# Patient Record
Sex: MALE | Race: WHITE | NOT HISPANIC OR LATINO | Employment: FULL TIME | URBAN - METROPOLITAN AREA
[De-identification: names, ages, dates, MRNs, and addresses within clinical notes are randomized per-mention and may not be internally consistent; named-entity substitution may affect disease eponyms.]

---

## 2017-10-31 ENCOUNTER — OFFICE VISIT (OUTPATIENT)
Dept: OTOLARYNGOLOGY | Facility: CLINIC | Age: 62
End: 2017-10-31

## 2017-10-31 VITALS — WEIGHT: 315 LBS | BODY MASS INDEX: 44.1 KG/M2 | HEIGHT: 71 IN

## 2017-10-31 DIAGNOSIS — C80.1 ADENOID CYSTIC CARCINOMA (H): Primary | ICD-10-CM

## 2017-10-31 ASSESSMENT — PAIN SCALES - GENERAL: PAINLEVEL: NO PAIN (0)

## 2017-10-31 NOTE — LETTER
10/31/2017       RE: Gabriele Márquez  [address] [facility] [address]     Dear Colleague,    Thank you for referring your patient, Gabriele Márquez, to the [facility] EAR NOSE [facility] THROAT at [facility]. Please see a copy of my visit note below.    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  He is 18 years out now.  He has a problem with his gold weight or similar with some recurrent conjunctivitis on the left side.  He is on eyedrops for that right now.  This seems to be an intermittent problem that recurs for him.  He has no other new complaints at the present time today.  He is otherwise getting by quite well.  His eating, drinking, breathing and swallowing are fine. He had a septic episode from a tick bit tjhat may have been related to lyme disease and was hospitalized fot that.      PHYSICAL EXAMINATION:  The patient is alert, oriented x3 and pleasant.  Skin of the face, lips, and neck on him is quite normal.  His oral cavity and oropharynx is clear throughout.  The neck and face on the left side show the previous changes from the surgery and radiation.  They look about the same.  His ear canals and tympanic membranes are examined.  He has a lot of deep wax on the left side.  I had to go ahead and extract the wax with alligator forceps on the left side.  Under magnification, I was able to get all the wax out deep near his drum.  He tolerated this quite well.  In looking at his eye on the left side, there is no question that I could palpate the gold weight.  There is a little bit of irritation to the conjunctiva as well.      ASSESSMENT AND PLAN:  Patient with a history of adenoid cystic cancer.  He will get his follow-up chest x-ray today.   We will see how he is doing again over the course of the next one year or so.      FO/ms     Sincerely,    Jose F Rock MD

## 2017-10-31 NOTE — PROGRESS NOTES
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  He is 18 years out now.  He has a problem with his gold weight or similar with some recurrent conjunctivitis on the left side.  He is on eyedrops for that right now.  This seems to be an intermittent problem that recurs for him.  He has no other new complaints at the present time today.  He is otherwise getting by quite well.  His eating, drinking, breathing and swallowing are fine. He had a septic episode from a tick bit tjhat may have been related to lyme disease and was hospitalized fot that.      PHYSICAL EXAMINATION:  The patient is alert, oriented x3 and pleasant.  Skin of the face, lips, and neck on him is quite normal.  His oral cavity and oropharynx is clear throughout.  The neck and face on the left side show the previous changes from the surgery and radiation.  They look about the same.  His ear canals and tympanic membranes are examined.  He has a lot of deep wax on the left side.  I had to go ahead and extract the wax with alligator forceps on the left side.  Under magnification, I was able to get all the wax out deep near his drum.  He tolerated this quite well.  In looking at his eye on the left side, there is no question that I could palpate the gold weight.  There is a little bit of irritation to the conjunctiva as well.      ASSESSMENT AND PLAN:  Patient with a history of adenoid cystic cancer.  He will get his follow-up chest x-ray today.   We will see how he is doing again over the course of the next one year or so.      FO/ms

## 2017-10-31 NOTE — MR AVS SNAPSHOT
After Visit Summary   10/31/2017    Gabriele Márquez    MRN: [id number]           Patient Information     Date Of Birth          1955        Visit Information        Provider Department      10/31/2017 10:00 AM Jose F Rock MD [facility] Ear Nose and Throat        Today's Diagnoses     Adenoid cystic carcinoma (H)    -  1       Follow-ups after your visit        Follow-up notes from your care team     Return in about 1 year (around 10/31/2018).      Future tests that were ordered for you today     Open Future Orders        Priority Expected Expires Ordered    X-ray Chest 2 Views Routine 10/1/2018 10/31/2018 10/31/2017            Who to contact     Please call your clinic at [phone] to:    Ask questions about your health    Make or cancel appointments    Discuss your medicines    Learn about your test results    Speak to your doctor   If you have compliments or concerns about an experience at your clinic, or if you wish to file a complaint, please contact [facility] Patient Relations at [phone] or email us at [email or web address]         Additional Information About Your Visit        MyChart Information     [employer] is an electronic gateway that provides easy, online access to your medical records. With [employer], you can request a clinic appointment, read your test results, renew a prescription or communicate with your care team.     To sign up for [employer] visit the website at [email or web address]   You will be asked to enter the access code listed below, as well as some personal information. Please follow the directions to create your username and password.     Your access code is: [email or web address]  Expires: 1/15/2018  6:31 AM     Your access code will  in 90 days. If you need help or a new code, please contact your [facility] Clinic or call [phone] for assistance.        Care EveryWhere ID     This is your  "Care EveryWhere ID. This could be used by other organizations to access your [place] medical records  [id number]        Your Vitals Were     Height BMI (Body Mass Index)                1.8 m (5' 10.87\") 46.62 kg/m2           Blood Pressure from Last 3 Encounters:   09/08/16 (!) 134/94    Weight from Last 3 Encounters:   10/31/17 (!) 151 kg (333 lb)   09/08/16 (!) 156.1 kg (344 lb 3 oz)   07/12/16 (!) 161.8 kg (356 lb 9.6 oz)               Primary Care Provider Office Phone # Fax #    Hilario Vasquez [phone]       [place] [address]        Equal Access to Services     MICHELLE ROWLEY : Karey fergusono Soomaali, waaxda luqadaha, qaybta kaalmada adeegyada, gayatri stout. So [facility] [phone].    ATENCIÓN: Si habla español, tiene a giron disposición servicios gratuitos de asistencia lingüística. [facility] [phone].    We comply with applicable federal civil rights laws and [place] laws. We do not discriminate on the basis of race, color, national origin, age, disability, sex, sexual orientation, or gender identity.            Thank you!     Thank you for choosing [facility] EAR NOSE AND THROAT  for your care. Our goal is always to provide you with excellent care. Hearing back from our patients is one way we can continue to improve our services. Please take a few minutes to complete the written survey that you may receive in the mail after your visit with us. Thank you!             Your Updated Medication List - Protect others around you: Learn how to safely use, store and throw away your medicines at www.disposemymeds.org.          This list is accurate as of: 10/31/17 11:02 AM.  Always use your most recent med list.                   Brand Name Dispense Instructions for use Diagnosis    SYNTHROID 175 MCG tablet   Generic drug:  levothyroxine      Take 1 tablet by mouth daily.        VITAMIN D PO      Take  by mouth.          "

## 2019-01-03 ENCOUNTER — PATIENT OUTREACH (OUTPATIENT)
Dept: CARE COORDINATION | Facility: CLINIC | Age: 64
End: 2019-01-03

## 2019-01-15 ENCOUNTER — ANCILLARY PROCEDURE (OUTPATIENT)
Dept: GENERAL RADIOLOGY | Facility: CLINIC | Age: 64
End: 2019-01-15
Payer: COMMERCIAL

## 2019-01-15 ENCOUNTER — TELEPHONE (OUTPATIENT)
Dept: OTOLARYNGOLOGY | Facility: CLINIC | Age: 64
End: 2019-01-15

## 2019-01-15 ENCOUNTER — OFFICE VISIT (OUTPATIENT)
Dept: OTOLARYNGOLOGY | Facility: CLINIC | Age: 64
End: 2019-01-15
Payer: COMMERCIAL

## 2019-01-15 VITALS — BODY MASS INDEX: 42.66 KG/M2 | HEIGHT: 72 IN | WEIGHT: 315 LBS

## 2019-01-15 DIAGNOSIS — H60.312 ACUTE DIFFUSE OTITIS EXTERNA OF LEFT EAR: Primary | ICD-10-CM

## 2019-01-15 DIAGNOSIS — H60.312 ACUTE DIFFUSE OTITIS EXTERNA OF LEFT EAR: ICD-10-CM

## 2019-01-15 DIAGNOSIS — H60.22 ACUTE MALIGNANT OTITIS EXTERNA OF LEFT EAR: ICD-10-CM

## 2019-01-15 DIAGNOSIS — C80.1 ADENOID CYSTIC CARCINOMA (H): ICD-10-CM

## 2019-01-15 DIAGNOSIS — C80.1 ADENOID CYSTIC CARCINOMA (H): Primary | ICD-10-CM

## 2019-01-15 RX ORDER — NEOMYCIN/POLYMYXIN B/HYDROCORT 3.5-10K-1
1-2 SUSPENSION, DROPS(FINAL DOSAGE FORM)(ML) OPHTHALMIC (EYE) 4 TIMES DAILY
Qty: 4 ML | Refills: 0 | Status: SHIPPED | OUTPATIENT
Start: 2019-01-15 | End: 2019-01-18

## 2019-01-15 ASSESSMENT — MIFFLIN-ST. JEOR: SCORE: 2362.55

## 2019-01-15 ASSESSMENT — PAIN SCALES - GENERAL: PAINLEVEL: NO PAIN (0)

## 2019-01-15 NOTE — TELEPHONE ENCOUNTER
[facility] Call Center    Phone Message    May a detailed message be left on voicemail: yes    Reason for Call: Other: [facility] pharmacy [phone], wants to know is there another prescription that is more cost efficient for the pt., states that the medication is covered but a higher co pay, please reach out to pharmacy to possible change the prescription.     Action Taken: Other: ENT

## 2019-01-15 NOTE — LETTER
1/15/2019       RE: Gabriele Márquez  [address] [facility] [address]     Dear Colleague,    Thank you for referring your patient, Gabriele Márquez, to the [facility] EAR NOSE AND THROAT at [facility]. Please see a copy of my visit note below.    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  He is 18 years out now.  He has a problem with his gold weight or similar with some recurrent conjunctivitis on the left side.  He is on eyedrops for that right now.  This seems to be an intermittent problem that recurs for him.  He has no other new complaints at the present time today.  He is otherwise getting by quite well.  His eating, drinking, breathing and swallowing are fine. He had a septic episode from a tick bit tjhat may have been related to lyme disease and was hospitalized fot that.      PHYSICAL EXAMINATION:  The patient is alert, oriented x3 and pleasant.  Skin of the face, lips, and neck on him is quite normal.  His oral cavity and oropharynx is clear throughout.  The neck and face on the left side show the previous changes from the surgery and radiation.  They look about the same.  His ear canals and tympanic membranes are examined.  He has a lot of deep wax on the left side.  I had to go ahead and extract the wax with alligator forceps on the left side.  Under magnification, I was able to get all the wax out deep near his drum.  He tolerated this quite well.  In looking at his eye on the left side, there is no question that I could palpate the gold weight.  There is a little bit of irritation to the conjunctiva as well.      ASSESSMENT AND PLAN:  Patient with a history of adenoid cystic cancer.  He will get his follow-up chest x-ray today.   We will see how he is doing again over the course of the next one year or so.      FO/ms       Sincerely,    Jose F Rock MD

## 2019-01-15 NOTE — NURSING NOTE
Chief Complaint   Patient presents with     RECHECK     annual follow up adenoid cystic carcinoma - swelling on left side      Neel Delgadillo, EMT

## 2019-01-16 LAB — RADIOLOGIST FLAGS: NORMAL

## 2019-01-16 NOTE — TELEPHONE ENCOUNTER
Talked w/ the pharmacistErmias. Recommends neomycin-polymyxin-DEXAMETHASONE instead of neomycin-polymyxin-hydrocortisone. Notified Dr. Rock's nurse, Lalo Andrews RN. Lalo will consult w/ Dr. Rock and call pharmacy back.    Sonja Guzman LPN

## 2019-01-18 ENCOUNTER — TELEPHONE (OUTPATIENT)
Dept: OTOLARYNGOLOGY | Facility: CLINIC | Age: 64
End: 2019-01-18

## 2019-01-18 DIAGNOSIS — H60.312 ACUTE DIFFUSE OTITIS EXTERNA OF LEFT EAR: ICD-10-CM

## 2019-01-18 DIAGNOSIS — C80.1 ADENOID CYSTIC CARCINOMA (H): Primary | ICD-10-CM

## 2019-01-18 RX ORDER — NEOMYCIN POLYMYXIN B SULFATES AND DEXAMETHASONE 3.5; 10000; 1 MG/ML; [USP'U]/ML; MG/ML
SUSPENSION/ DROPS OPHTHALMIC
Qty: 5 ML | Refills: 0 | Status: SHIPPED | OUTPATIENT
Start: 2019-01-18 | End: 2019-01-22

## 2019-01-18 NOTE — TELEPHONE ENCOUNTER
"I received a call from [facility], informing me that Dr. Leno Bains placed a radiologist flag on patient's xray chest 01/15/2019.    Per report \"new retrocardiac consolidation which on the lateral chest x-ray near the thoracic vertebral body or lung base.. Consider further work up with CT scan.\"    Dr. Leno Bains could be reached at [phone].    I called and left a message for Dr. Rock to return call.  I also e-mailed him.    I briefly discussed above with JENIFER La.  She instructed me to contact Dr. Rock and patient.    I was unable to get a hold of patient, I left a voice message encouraging a call back as soon as possible.  I will discuss about symptoms.    Will wait to hear back from Dr. Rock and patient.   "

## 2019-01-18 NOTE — TELEPHONE ENCOUNTER
I discussed below with Dr. Rock.  He recommended CT lung without contract in 6-8 weeks.      I called and informed patient.     He reports feeling well.   He denied any unusual fever, chills, coughing, chest discomfort, back discomfort, fatigue, loss of appetite, clammy skin, sweating, fast/shallow breathing, coughing, wheezing, nasal congestion at this time.  He does note some shortness of breathe per his norm.    I encouraged him to follow up with primary care provider if he was to have any symptoms in the future.    He will schedule CT without contract lung scan in 6-8 weeks.

## 2019-01-18 NOTE — TELEPHONE ENCOUNTER
I discussed below with Dr. Rock and he approved pharmacy alternative recommendation, neomycin-polymixin-dexamethasone (MAXITROL) 0.1 % ophthalmic suspension, apply 1-2 drops to eye 4 times daily for 10 days.    I called and informed Luc at the pharmacy, updated rx, and will inform patient.

## 2019-01-18 NOTE — TELEPHONE ENCOUNTER
[facility] Call Center    Phone Message    May a detailed message be left on voicemail: yes    Reason for Call: Other: [facility] [place]  pharmacy [phone], states that the script is for pts eyes, and pt is stating he he needs a script for wax in his ears. Need more clarification on whats going on with this medication, or need a new script for pt ear for the wax     Action Taken: Message routed to:  [facility] ENT

## 2019-01-21 ENCOUNTER — TELEPHONE (OUTPATIENT)
Dept: OTOLARYNGOLOGY | Facility: CLINIC | Age: 64
End: 2019-01-21

## 2019-01-21 NOTE — TELEPHONE ENCOUNTER
[facility] Call [facility]    Phone Message    May a detailed message be left on voicemail: yes    Reason for Call: Other: pt. pharmacy called to see what is going on with RX neomycin-polymixin-dexamethasone (MAXITROL) 0.1 % ophthalmic suspension. The directions state to put in the eyes but pt. says he isbeing treated for his ears. please call pharmacy to clarify directions asap as they have been waiting for some time.      Action Taken: Message routed to:  [facility] ENT

## 2019-01-21 NOTE — TELEPHONE ENCOUNTER
[facility] Call [facility]    Phone Message    May a detailed message be left on voicemail: yes    Reason for Call: Other: Other: [facility] Cell [phone], Pt.'s wife called because the Pt. and she is confused on why the pt. needs a CT scan. They would like if someone can call discuss      Action Taken: Message routed to:  [facility] ENT

## 2019-01-21 NOTE — TELEPHONE ENCOUNTER
[facility] Call [facility]    Phone Message    May a detailed message be left on voicemail: yes    Reason for Call: Other: Other: [facility] Cell [phone], Pt.'s wife called because the Pt. and she is confused on why the pt. needs a CT scan. They would like if someone can call discuss      Action Taken: Message routed to:  [facility] ENT

## 2019-01-21 NOTE — TELEPHONE ENCOUNTER
[facility] Call [facility]    Phone Message    May a detailed message be left on voicemail: yes    Reason for Call: Other: [facility] [phone], Pt.'s wife called because the Pt. and she is confused on why the pt. needs a CT scan. They would like if someone can call discuss     Action Taken: Message routed to:  [facility] ENT

## 2019-01-22 RX ORDER — NEOMYCIN POLYMYXIN B SULFATES AND DEXAMETHASONE 3.5; 10000; 1 MG/ML; [USP'U]/ML; MG/ML
SUSPENSION/ DROPS OPHTHALMIC
Qty: 5 ML | Refills: 0 | Status: ON HOLD | OUTPATIENT
Start: 2019-01-22 | End: 2021-06-29

## 2020-06-26 ENCOUNTER — TELEPHONE (OUTPATIENT)
Dept: OTOLARYNGOLOGY | Facility: CLINIC | Age: 65
End: 2020-06-26

## 2020-06-26 NOTE — TELEPHONE ENCOUNTER
Phone call returned. No answer. Voicemail left with direct line for call-back.     Natice Schwab, RN BSN

## 2020-06-26 NOTE — TELEPHONE ENCOUNTER
[facility] Call [facility]    Phone Message    May a detailed message be left on voicemail: yes     Reason for Call: Symptoms or Concerns     If patient has red-flag symptoms, warm transfer to triage line    Current symptom or concern: mouth dryness     Symptoms have been present for:  Multiple week(s)    Has patient previously been seen for this? Yes    By : Ondrey     Date: 1/2019    Are there any new or worsening symptoms? Yes: pt wife calling in as patient is normally seen yearly for follow up -  She stating he is having more mouth dryness and speech issues - would like to know if he can be seen in clinic soon     Please advise       Action Taken: Message routed to:  [facility] ENT    Travel Screening: Not Applicable

## 2020-07-14 ENCOUNTER — ANCILLARY PROCEDURE (OUTPATIENT)
Dept: GENERAL RADIOLOGY | Facility: CLINIC | Age: 65
End: 2020-07-14
Attending: OTOLARYNGOLOGY
Payer: COMMERCIAL

## 2020-07-14 ENCOUNTER — OFFICE VISIT (OUTPATIENT)
Dept: OTOLARYNGOLOGY | Facility: CLINIC | Age: 65
End: 2020-07-14
Payer: COMMERCIAL

## 2020-07-14 VITALS
HEART RATE: 82 BPM | BODY MASS INDEX: 44.89 KG/M2 | WEIGHT: 315 LBS | OXYGEN SATURATION: 83 % | TEMPERATURE: 97.9 F | SYSTOLIC BLOOD PRESSURE: 126 MMHG | DIASTOLIC BLOOD PRESSURE: 83 MMHG

## 2020-07-14 DIAGNOSIS — C80.1 ADENOID CYSTIC CARCINOMA (H): Primary | ICD-10-CM

## 2020-07-14 DIAGNOSIS — C80.1 ADENOID CYSTIC CARCINOMA (H): ICD-10-CM

## 2020-07-14 RX ORDER — LEVOTHYROXINE SODIUM 150 UG/1
175 TABLET ORAL
COMMUNITY
Start: 2019-10-02

## 2020-07-14 ASSESSMENT — PAIN SCALES - GENERAL: PAINLEVEL: NO PAIN (0)

## 2020-07-14 NOTE — NURSING NOTE
Chief Complaint   Patient presents with     RECHECK     follow up      Blood pressure 126/83, pulse 82, temperature 97.9  F (36.6  C), weight (!) 150.1 kg (331 lb), SpO2 (!) 83 %.    Alfredo Ortega LPN

## 2020-07-14 NOTE — PROGRESS NOTES
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  He is 18 years out now.  He has a problem with his gold weight or similar with some recurrent conjunctivitis on the left side.  He is on eyedrops for that right now.  This seems to be an intermittent problem that recurs for him.  He has no other new complaints at the present time today.  He is otherwise getting by quite well.  His eating, drinking, breathing and swallowing are fine. He had a septic episode from a tick bit tjhat may have been related to lyme disease and was hospitalized fot that.      PHYSICAL EXAMINATION:  The patient is alert, oriented x3 and pleasant.  Skin of the face, lips, and neck on him is quite normal.  His oral cavity and oropharynx is clear throughout.  The neck and face on the left side show the previous changes from the surgery and radiation.  They look about the same.  His ear canals and tympanic membranes are examined.  He has a lot of deep wax on the left side.  I had to go ahead and extract the wax with alligator forceps on the left side.  Under magnification, I was able to get all the wax out deep near his drum.  He tolerated this quite well.  In looking at his eye on the left side, there is no question that I could palpate the gold weight.  There is a little bit of irritation to the conjunctiva as well.      ASSESSMENT AND PLAN:  Patient with a history of adenoid cystic cancer.  He will get his follow-up chest x-ray today.   We will see how he is doing again over the course of the next one year or so.   However he has expresed wanting to see dr Flores valentino about nasal valve issues.    FO/ms

## 2020-07-14 NOTE — LETTER
7/14/2020       RE: Gabriele Márquez  [address] [facility] [address]     Dear Colleague,    Thank you for referring your patient, Gabriele Márquez, to the [facility] EAR NOSE AND THROAT at [facility]. Please see a copy of my visit note below.    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  He is 18 years out now.  He has a problem with his gold weight or similar with some recurrent conjunctivitis on the left side.  He is on eyedrops for that right now.  This seems to be an intermittent problem that recurs for him.  He has no other new complaints at the present time today.  He is otherwise getting by quite well.  His eating, drinking, breathing and swallowing are fine. He had a septic episode from a tick bit tjhat may have been related to lyme disease and was hospitalized fot that.      PHYSICAL EXAMINATION:  The patient is alert, oriented x3 and pleasant.  Skin of the face, lips, and neck on him is quite normal.  His oral cavity and oropharynx is clear throughout.  The neck and face on the left side show the previous changes from the surgery and radiation.  They look about the same.  His ear canals and tympanic membranes are examined.  He has a lot of deep wax on the left side.  I had to go ahead and extract the wax with alligator forceps on the left side.  Under magnification, I was able to get all the wax out deep near his drum.  He tolerated this quite well.  In looking at his eye on the left side, there is no question that I could palpate the gold weight.  There is a little bit of irritation to the conjunctiva as well.      ASSESSMENT AND PLAN:  Patient with a history of adenoid cystic cancer.  He will get his follow-up chest x-ray today.   We will see how he is doing again over the course of the next one year or so.   However he has expresed wanting to see dr Flores valentino about nasal valve issues.    FO/ms          Again, thank you for allowing me to participate in the care of your patient.      Sincerely,    Jose F Rock MD

## 2020-07-14 NOTE — PATIENT INSTRUCTIONS
1. You were seen in the ENT Clinic today by Dr. Rock.  If you have any questions or concerns after your appointment, please call   -  ENT Clinic: [phone]      2.   Plan to return to clinic 1 month for follow up and Chest Xray    Bren Felipe LPN  [facility]  [phone]

## 2020-07-15 ENCOUNTER — TELEPHONE (OUTPATIENT)
Dept: OTOLARYNGOLOGY | Facility: CLINIC | Age: 65
End: 2020-07-15

## 2020-07-15 NOTE — TELEPHONE ENCOUNTER
Writer called in regards to recent CXR that was completed yesterday. Writer reviewed results with patient and his wife. Results as follows:    IMPRESSION: Negative. No radiographic evidence of metastatic disease.       They acknowledged. Per Dr. Rock to schedule a yearly follow up. Writer offered to schedule. Patients wife states they would rather wait and call at a later time to get this scheduled.     Bren Felipe LPN

## 2020-07-15 NOTE — TELEPHONE ENCOUNTER
FUTURE VISIT INFORMATION      FUTURE VISIT INFORMATION:    Date: 8/19/2020    Time: 1:20PM    Location: [facility]  REFERRAL INFORMATION:    Referring provider:      Referring providers clinic:      Reason for visit/diagnosis  nasal valve issues.     RECORDS REQUESTED FROM:       Clinic name Comments Records Status Imaging Status   [facility] 7/14/2020 note from Dr Rock  [facility]   03/09/2020 note from Napoleon Araujo MD   Care Everywhere

## 2020-08-04 ENCOUNTER — TRANSCRIBE ORDERS (OUTPATIENT)
Dept: OTHER | Age: 65
End: 2020-08-04

## 2020-08-04 DIAGNOSIS — G47.33 OSA (OBSTRUCTIVE SLEEP APNEA): ICD-10-CM

## 2020-08-04 DIAGNOSIS — R06.09 DOE (DYSPNEA ON EXERTION): Primary | ICD-10-CM

## 2020-08-19 ENCOUNTER — OFFICE VISIT (OUTPATIENT)
Dept: OTOLARYNGOLOGY | Facility: CLINIC | Age: 65
End: 2020-08-19
Payer: COMMERCIAL

## 2020-08-19 ENCOUNTER — PRE VISIT (OUTPATIENT)
Dept: OTOLARYNGOLOGY | Facility: CLINIC | Age: 65
End: 2020-08-19

## 2020-08-19 VITALS — OXYGEN SATURATION: 89 % | HEART RATE: 91 BPM | WEIGHT: 315 LBS | BODY MASS INDEX: 45.3 KG/M2 | TEMPERATURE: 97.6 F

## 2020-08-19 DIAGNOSIS — G51.0 FACIAL PARALYSIS: Primary | ICD-10-CM

## 2020-08-19 DIAGNOSIS — C07 ADENOID CYSTIC CARCINOMA OF PAROTID GLAND (H): ICD-10-CM

## 2020-08-19 ASSESSMENT — PAIN SCALES - GENERAL: PAINLEVEL: NO PAIN (0)

## 2020-08-19 NOTE — PROGRESS NOTES
Facial Plastic and Reconstructive Surgery Consultation    Referring Provider:  Jose F Rock MD      HPI:   I had the pleasure of seeing Gabriele Márquez today in clinic for consultation for nasal valve collapse in the setting of facial paralysis.     Gabriele Márquez is a 64 year old male with a history of adenoid cystic carcinoma. This was first treated 18 years ago.  He underwent an initial surgery that was incomplete and subsequently had a radical parotidectomy with Dr. Hilario Magdaleno.  His wife and the patient describes that his left facial paralysis resulted from that initial surgery.  He had a fascia ryan sling to the left face from the left side for facial suspension and he also had a left upper eyelid gold weight.  In addition the patient underwent neutron beam therapy in [place].    Since that time he has had intermittent trouble with closure of his left eye and concerns with the eyelid weight.  He most recently underwent a procedure with Dr. Adithya Lynch for the left upper eyelid weight with a removal and replacement in 2016.  Patient still notes she has incomplete closure of the eye on that side.    He presented Dr. Rock who is following him here.  He has not had any recent MRIs but did have a PET/CT that is visible from 2019 in addition to repeat chest x-ray is evaluating for any possible disease.  His wife state has been several years since he has had repeat imaging to monitor the disease.    Today in discussion is notable that he has bilateral nasal valve collapse.  He cannot breathe from either side of his nose.  He states that he used to use Breathe Right strips but that now he uses a cone inside his nose at night to be able to breathe.  He does have noticeable bilateral facial weakness on taking him the history.  He has complete paralysis on the left but he has significant weakness on the right.     He does not note any cause or reason for his right facial weakness.  It is something that seems as if I  am pointing out to him for the first time.  He does state on history of that he was able to smile effectively on that side and his wife also states that he had full function and smiling there in the past.    He denies any difficulty eating and states he is adapted quite well.  He uses cups and does not have a need for using a straw.    His nasal obstruction significantly symptomatic.  He feels he cannot move any air and feels quite constricted.  He has difficulty at night.  His wife states that he has had a recent episode of ehrlichiosis which led to multiorgan failure.  She states he was intubated for quite some time.  Now residual sequelae of that seem to be a reliance on oxygen at night.  He has obstructive sleep apnea and uses a CPAP at night with 4 L of oxygen.  He does not use any oxygen during the day.    His wife states that he is a  and thus oxygen would not allow him to work and that he does relatively well without needing it during the day.    Left eye lagophthlamos, cant close with the weight  Bilateral paralysis    Sling on the left side,     Review Of Systems  ROS: 10 point ROS neg other than the symptoms noted above in the HPI.    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       neomycin-polymixin-dexamethasone (MAXITROL) 0.1 % ophthalmic suspension Apply 1-2 drops to ears 4 times daily for 10 days (Patient not taking: Reported on 7/14/2020) 5 mL 0     Nkda [no known drug  allergies]  Social History     Socioeconomic History     Marital status:      Spouse name: Not on file     Number of children: Not on file     Years of education: Not on file     Highest education level: Not on file   Occupational History     Not on file   Social Needs     Financial resource strain: Not on file     Food insecurity     Worry: Not on file     Inability: Not on file     Transportation needs     Medical: Not on file     Non-medical: Not on file   Tobacco Use     Smoking status: Never Smoker     Smokeless tobacco: Never Used   Substance and Sexual Activity     Alcohol use: Yes     Comment: one per week     Drug use: No     Sexual activity: Yes     Partners: Female     Birth control/protection: Female Surgical, None   Lifestyle     Physical activity     Days per week: Not on file     Minutes per session: Not on file     Stress: Not on file   Relationships     Social connections     Talks on phone: Not on file     Gets together: Not on file     Attends Confucianist service: Not on file     Active member of club or organization: Not on file     Attends meetings of clubs or organizations: Not on file     Relationship status: Not on file     Intimate partner violence     Fear of current or ex partner: Not on file     Emotionally abused: Not on file     Physically abused: Not on file     Forced sexual activity: Not on file   Other Topics Concern     Not on file   Social History Narrative     Not on file     Family History   Problem Relation Age of Onset     Glaucoma No family hx of      Macular Degeneration No family hx of        PE:  Alert and Oriented, Answering Questions Appropriately  Atraumatic, Normocephalic,   Significant bilateral facial weakness.  He has a masked face ease on examination.  On the left side he has no facial function.  He not cannot elevate his brow, he has paralytic lagophthalmos with an eyelid weight in.  It takes quite some time for his lid to relax down for closure.  He does  have some scleral show when that occurs.  He has a significant postradiation changes to the skin on the left and a large concavity at the radical parotidectomy site.  There is no exposed bone and his external auditory canal looks intact.  He has complete external nasal valve collapse on this left side.  Pulling on his cheek leads to significant resolution of his nasal obstruction.  He has lost significant volume in his face on the left.  Oral commissure continues to be suspended with a static sling.  On the right side he has significant midface volume loss.  He has also complete external nasal valve collapse.  When asked to perform voluntary function on the right side of the face he has evidence of some voluntary twitching however this is approximately only 80% or 85% of the expected normal.  He has limited brow elevation.  He has eye closure but this takes effort.  His blink is decreased.  His nasalis and levator labile are quite limited in function the midface weakness is substantial.  His oral commissure has limited excursion and only excursion has excursion of about 20% of expected.  He can pucker with the upper and lower lip but has some difficulty lowering his lower lip.  Most significant is the total collapse of his nasal valve on the right and a significant volume loss in his midface.  No masses are palpable in his neck face or anywhere on the exam.  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  He does however have a facial weakness on the right side.  This affects all of the branches of the face.  He does feel that the brow elevation was limited from when he was a child due to a laceration that occurred across his brow however this seems primary motor to me.  I noted weakness in all of the branches of the facial nerve.  Most substantial with his midface weakness.  He also has significant volume loss in the midface.  He has difficulty moving his upper lip most consistently and his speech is affected by this.    Looking at his history I have a hard time explaining the right facial weakness just based on the past treatments.  I do think it would be quite valuable to perform an MRI to evaluate the 7th cranial nerve and for general kind of evaluation with his history of adenoid cystic carcinoma.  We will be able to treat his nasal valves bilaterally.  I would do this using fascia from the right thigh creating 2 strips 1 for the left and 1 for the right and suspending the nasal ala bilaterally.  This is been shown in patients like him to have significant improvement in breathing results.  He would have a more challenging exposure with the scarring on the left and I discussed this with them.  I still think however that that would be appropriate.    He does have obstructive sleep apnea and thus any surgery would have to be done in the hospital and he have to be observed afterwards.  He also has claustrophobia and thus the MRI would have to be performed in an open scanner.  We will work to schedule this for him.  I will call him with the results.          Photodocumentation was obtained.     I spent a total of 44 minutes face-to-face with  Gabriele Márquez during today's office visit.  Over 50% of this time was spent counseling the patient and/or coordinating care regarding facial paralysis.  See note for details.

## 2020-08-19 NOTE — NURSING NOTE
Chief Complaint   Patient presents with     Consult     Nasal obstruction         Pulse 91, temperature 97.6  F (36.4  C), weight (!) 151.5 kg (334 lb), SpO2 (!) 89 %.    Tashia Gooden, EMT

## 2020-08-19 NOTE — LETTER
8/19/2020       RE: Gabriele Márquez  [address] [address]     Dear Colleague,    Thank you for referring your patient, Gabriele Márquez, to the [facility] EAR NOSE AND THROAT at [facility]. Please see a copy of my visit note below.    Facial Plastic and Reconstructive Surgery Consultation    Referring Provider:  Jose F Rock MD      HPI:   I had the pleasure of seeing Gabriele Márquez today in clinic for consultation for nasal valve collapse in the setting of facial paralysis.     Gabriele Márquez is a 64 year old male with a history of adenoid cystic carcinoma. This was first treated 18 years ago.  He underwent an initial surgery that was incomplete and subsequently had a radical parotidectomy with Dr. Hilario Magdaleno.  His wife and the patient describes that his left facial paralysis resulted from that initial surgery.  He had a fascia ryan sling to the left face from the left side for facial suspension and he also had a left upper eyelid gold weight.  In addition the patient underwent neutron beam therapy in [place].    Since that time he has had intermittent trouble with closure of his left eye and concerns with the eyelid weight.  He most recently underwent a procedure with Dr. Adithya Lynch for the left upper eyelid weight with a removal and replacement in 2016.  Patient still notes she has incomplete closure of the eye on that side.    He presented Dr. Rock who is following him here.  He has not had any recent MRIs but did have a PET/CT that is visible from 2019 in addition to repeat chest x-ray is evaluating for any possible disease.  His wife state has been several years since he has had repeat imaging to monitor the disease.    Today in discussion is notable that he has bilateral nasal valve collapse.  He cannot breathe from either side of his nose.  He states that he used to use Breathe Right strips but that now he uses a cone inside his nose at night to be  able to breathe.  He does have noticeable bilateral facial weakness on taking him the history.  He has complete paralysis on the left but he has significant weakness on the right.     He does not note any cause or reason for his right facial weakness.  It is something that seems as if I am pointing out to him for the first time.  He does state on history of that he was able to smile effectively on that side and his wife also states that he had full function and smiling there in the past.    He denies any difficulty eating and states he is adapted quite well.  He uses cups and does not have a need for using a straw.    His nasal obstruction significantly symptomatic.  He feels he cannot move any air and feels quite constricted.  He has difficulty at night.  His wife states that he has had a recent episode of ehrlichiosis which led to multiorgan failure.  She states he was intubated for quite some time.  Now residual sequelae of that seem to be a reliance on oxygen at night.  He has obstructive sleep apnea and uses a CPAP at night with 4 L of oxygen.  He does not use any oxygen during the day.    His wife states that he is a  and thus oxygen would not allow him to work and that he does relatively well without needing it during the day.    Left eye lagophthlamos, cant close with the weight  Bilateral paralysis    Sling on the left side,     Review Of Systems  ROS: 10 point ROS neg other than the symptoms noted above in the HPI.    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       neomycin-polymixin-dexamethasone (MAXITROL) 0.1 % ophthalmic suspension Apply 1-2 drops to ears 4 times daily for 10 days (Patient not taking: Reported on 7/14/2020) 5 mL 0     Nkda [no known drug allergies]  Social History     Socioeconomic History     Marital status:      Spouse name: Not on file     Number of children: Not on file     Years of education: Not on file     Highest education level: Not on file   Occupational History     Not on file   Social Needs     Financial resource strain: Not on file     Food insecurity     Worry: Not on file     Inability: Not on file     Transportation needs     Medical: Not on file     Non-medical: Not on file   Tobacco Use     Smoking status: Never Smoker     Smokeless tobacco: Never Used   Substance and Sexual Activity     Alcohol use: Yes     Comment: one per week     Drug use: No     Sexual activity: Yes     Partners: Female     Birth control/protection: Female Surgical, None   Lifestyle     Physical activity     Days per week: Not on file     Minutes per session: Not on file     Stress: Not on file   Relationships     Social connections     Talks on phone: Not on file     Gets together: Not on file     Attends Amish service: Not on file     Active member of club or organization: Not on file     Attends meetings of clubs or organizations: Not on file     Relationship status: Not on file     Intimate partner violence     Fear of current or ex partner: Not on file     Emotionally abused: Not on file     Physically abused: Not on file     Forced sexual activity: Not on file   Other Topics Concern     Not on file   Social History Narrative     Not on file     Family History   Problem Relation Age of Onset     Glaucoma No family hx of      Macular Degeneration No family hx of        PE:  Alert and Oriented, Answering Questions Appropriately  Atraumatic,  Normocephalic,   Significant bilateral facial weakness.  He has a masked face ease on examination.  On the left side he has no facial function.  He not cannot elevate his brow, he has paralytic lagophthalmos with an eyelid weight in.  It takes quite some time for his lid to relax down for closure.  He does have some scleral show when that occurs.  He has a significant postradiation changes to the skin on the left and a large concavity at the radical parotidectomy site.  There is no exposed bone and his external auditory canal looks intact.  He has complete external nasal valve collapse on this left side.  Pulling on his cheek leads to significant resolution of his nasal obstruction.  He has lost significant volume in his face on the left.  Oral commissure continues to be suspended with a static sling.  On the right side he has significant midface volume loss.  He has also complete external nasal valve collapse.  When asked to perform voluntary function on the right side of the face he has evidence of some voluntary twitching however this is approximately only 80% or 85% of the expected normal.  He has limited brow elevation.  He has eye closure but this takes effort.  His blink is decreased.  His nasalis and levator labile are quite limited in function the midface weakness is substantial.  His oral commissure has limited excursion and only excursion has excursion of about 20% of expected.  He can pucker with the upper and lower lip but has some difficulty lowering his lower lip.  Most significant is the total collapse of his nasal valve on the right and a significant volume loss in his midface.  No masses are palpable in his neck face or anywhere on the exam.  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  He does however have a facial weakness on the right side.  This affects all of the branches of the face.  He does feel that the brow elevation was limited from when he was a child due to a laceration that occurred across his brow however this seems primary motor to me.  I noted weakness in all of the branches of the facial nerve.  Most substantial with his midface weakness.  He also has significant volume loss in the midface.  He has difficulty moving his upper lip most consistently and his speech is affected by this.    Looking at his history I have a hard time explaining the right facial weakness just based on the past treatments.  I do think it would be quite valuable to perform an MRI to evaluate the 7th cranial nerve and for general kind of evaluation with his history of adenoid cystic carcinoma.  We will be able to treat his nasal valves bilaterally.  I would do this using fascia from the right thigh creating 2 strips 1 for the left and 1 for the right and suspending the nasal ala bilaterally.  This is been shown in patients like him to have significant improvement in breathing results.  He would have a more challenging exposure with the scarring on the left and I discussed this with them.  I still think however that that would be appropriate.    He does have obstructive sleep apnea and thus any surgery would have to be done in the hospital and he  have to be observed afterwards.  He also has claustrophobia and thus the MRI would have to be performed in an open scanner.  We will work to schedule this for him.  I will call him with the results.          Photodocumentation was obtained.     I spent a total of 44 minutes face-to-face with Gabriele Márquez during today's office visit.  Over 50% of this time was spent counseling the patient and/or coordinating care regarding facial paralysis.  See note for details.        Again, thank you for allowing me to participate in the care of your patient.      Sincerely,    Harriet Dudley MD

## 2020-08-19 NOTE — NURSING NOTE
Photodocumentation obtained.    MRI of Brain and MRI of Face ordered and need to be scheduled in an open MRI machine.    Will follow up with patient to schedule.    Angelica Aguirre RN  8/19/2020 3:33 PM

## 2020-08-21 ENCOUNTER — TELEPHONE (OUTPATIENT)
Dept: OTOLARYNGOLOGY | Facility: CLINIC | Age: 65
End: 2020-08-21

## 2020-08-21 NOTE — TELEPHONE ENCOUNTER
LVM letting pt know that MRI's ordered by provider have been scheduled with [facility] as we do not have open MRI machines.    Patient is scheduled for  at 12:20PM. Contact information for them is:    [address] [address] [place] [address], [address]  [place] [address]  [phone]     If any rescheduling is needed pt should contact [facility] directly.     Provider will facilitate faxing over MRI orders to facility. If patient has any additional questions please send message to ENT clinic pool.

## 2020-08-28 ENCOUNTER — TRANSFERRED RECORDS (OUTPATIENT)
Dept: HEALTH INFORMATION MANAGEMENT | Facility: CLINIC | Age: 65
End: 2020-08-28

## 2020-09-09 ENCOUNTER — TRANSFERRED RECORDS (OUTPATIENT)
Dept: HEALTH INFORMATION MANAGEMENT | Facility: CLINIC | Age: 65
End: 2020-09-09

## 2020-09-28 ENCOUNTER — TELEPHONE (OUTPATIENT)
Dept: PLASTIC SURGERY | Facility: CLINIC | Age: 65
End: 2020-09-28

## 2020-09-28 NOTE — TELEPHONE ENCOUNTER
[facility] Call Center    Phone Message    May a detailed message be left on voicemail: yes     Reason for Call: Other: Pt's Wife calling about the results of the MRI to go over with Dr Dudley Hadn't heard anything since they had MRI, Please call Pt's wife/ Pt to discuss  Thank you,    Action Taken: Message routed to:  [facility] ([facility]): ENT    Travel Screening: Not Applicable

## 2020-09-30 NOTE — TELEPHONE ENCOUNTER
Patient discussed results with Dr Dudley yesterday and will be scheduling a follow up appointment.    Nella Puente, EMT

## 2020-10-21 ENCOUNTER — OFFICE VISIT (OUTPATIENT)
Dept: OTOLARYNGOLOGY | Facility: CLINIC | Age: 65
End: 2020-10-21
Payer: COMMERCIAL

## 2020-10-21 VITALS
TEMPERATURE: 98.4 F | WEIGHT: 315 LBS | BODY MASS INDEX: 42.66 KG/M2 | HEART RATE: 83 BPM | OXYGEN SATURATION: 94 % | HEIGHT: 72 IN

## 2020-10-21 DIAGNOSIS — G51.0 IDIOPATHIC FACIAL PARALYSIS: ICD-10-CM

## 2020-10-21 DIAGNOSIS — J34.89 NASAL OBSTRUCTION: Primary | ICD-10-CM

## 2020-10-21 DIAGNOSIS — E66.01 MORBID OBESITY (H): ICD-10-CM

## 2020-10-21 DIAGNOSIS — C07 ADENOID CYSTIC CARCINOMA OF PAROTID GLAND (H): ICD-10-CM

## 2020-10-21 DIAGNOSIS — J34.829 NASAL VALVE COLLAPSE: ICD-10-CM

## 2020-10-21 PROCEDURE — 99214 OFFICE O/P EST MOD 30 MIN: CPT | Performed by: OTOLARYNGOLOGY

## 2020-10-21 ASSESSMENT — PAIN SCALES - GENERAL: PAINLEVEL: NO PAIN (0)

## 2020-10-21 ASSESSMENT — MIFFLIN-ST. JEOR: SCORE: 2352.55

## 2020-10-21 NOTE — NURSING NOTE
Chief Complaint   Patient presents with     RECHECK     Follow up after MRI       Pulse 83, temperature 98.4  F (36.9  C), temperature source Temporal, height 1.829 m (6'), weight (!) 152 kg (335 lb), SpO2 94 %.    Nella Puente, EMT

## 2020-10-21 NOTE — PROGRESS NOTES
Left radical parotid for adenoid cystic    Neutron beam therapy    Right-sided facial paresis    Bilateral brow ptosis    Visual field testing    Nasal obstruction    External and internal nasal valve collapse    Nay Beverly reviewed MRI evaluated the patient    Facial Plastic and Reconstructive Surgery Consultation    Referring Provider:  Jose F Rock MD    HPI:   I had the pleasure of seeing Gabriele Márquez today in clinic for consultation for facial paralysis and nasal obstruction.    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 I noted however on exam a partial right facial paresis. This is not consistent with any of his history and he and his wife did not notice. I had MRI performed which I reviewed and I also had our head and neck surgeon Nay Beverly review. Neither of us saw a lesion to explain the weakness. Dr. Beverly also examined the patient and noted the right facial weakness in addition to the baseline left facial paralysis.    Gabriele primarily complains of nasal obstruction and would like this addressed. He cannot move much air through his nose and feels like his nose is consistently closed off.     He also describes upper lids and dropped brows interfering with superior visual field and interfering with activities of daily living including reading, driving and watching television.        Review Of Systems  ROS: 10 point ROS neg other than the symptoms noted above in the HPI.    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       neomycin-polymixin-dexamethasone (MAXITROL) 0.1 % ophthalmic suspension Apply 1-2 drops to ears 4 times daily for 10 days (Patient not taking: Reported on 7/14/2020) 5 mL 0     Nkda [no known drug allergies]  Social History     Socioeconomic History     Marital status:      Spouse name: Not on file     Number of children: Not on file     Years of education: Not on file     Highest education level: Not on file   Occupational History     Not on file   Social Needs     Financial resource strain: Not on file     Food insecurity     Worry: Not on file     Inability: Not on file     Transportation needs     Medical: Not on file     Non-medical: Not on file   Tobacco Use     Smoking status: Never Smoker     Smokeless tobacco: Never Used   Substance and Sexual Activity     Alcohol use: Yes     Comment: one per week     Drug use: No     Sexual activity: Yes     Partners: Female     Birth control/protection: Female Surgical, None   Lifestyle     Physical activity     Days per week: Not on file     Minutes per session: Not on file     Stress: Not on file   Relationships     Social connections     Talks on phone: Not on file     Gets together: Not on file     Attends Gnosticism service: Not on file     Active member of club or organization: Not on file     Attends meetings of clubs or organizations: Not on file     Relationship status: Not on file     Intimate partner violence     Fear of current or ex partner: Not on file     Emotionally abused: Not on file     Physically abused: Not on file     Forced sexual  activity: Not on file   Other Topics Concern     Not on file   Social History Narrative     Not on file     Family History   Problem Relation Age of Onset     Glaucoma No family hx of      Macular Degeneration No family hx of        PE:  Alert and Oriented, Answering Questions Appropriately  Atraumatic, Normocephalic, Face asymmetric  Left complete facial paralysis, with static facial suspension  Atrophy of the midface soft tissues bilaterally with almost complete external nasal valve collapse  The ala are thin and almost fully pinched bilaterally.  He has paresis on the right with mostly midface paralysis  Bilateral brow paralysis with brow ptosis and hooding of skin over the lashes bilaterally, when the brow is supported, vision improves substantially bilaterally   No masses or lesions palpable bilaterally.       Imaging: MRI reviewed once again      IMPRESSION:  Facial paralysis, left  Facial paresis right  Nasal obstruction  Nasal valve collapse, bilaterally severe  Brow ptosis  Visual field obstruction    The encounter diagnosis was Morbid obesity (H).   Known issue that I take into account for medical decisions, no current exacerbation or acute concern but needs to be considered for surgery and surgical risk          PLAN:    Visual field testing    Repair of nasal valve collapse: will need large reconstruction with large grafts laterally, will approach through the nose as nasal alar suspension through the face with fascia is not his preferred method with the discussion of risks with his radiation and previously operated area    Tumor board for evaluation of paresis on right: Discussion resulted in MRI review and enlarged parotid on right with no obvious facial nerve lesion, radiation oncology did not believe right weakness would be attributable to past neutron beam radiation. They have recommended repeat MRI.    We will repeat it 6 months from the prior exam if not sooner if neurologic changes develop.    No  orders of the defined types were placed in this encounter.        Photodocumentation was obtained.     I spent a total of 40 minutes face-to-face with Gabriele Márquez during today's office visit.  Over 50% of this time was spent counseling the patient and/or coordinating care regarding complex medical presentation with multiple morbidities.  See note for details.

## 2020-10-21 NOTE — LETTER
10/21/2020       RE: Gabriele Márquez  [address] [address]     Dear Colleague,    Thank you for referring your patient, Gabriele Márquez, to the [facility] [place] [facility]. Please see a copy of my visit note below.    Left radical parotid for adenoid cystic    Neutron beam therapy    Right-sided facial paresis    Bilateral brow ptosis    Visual field testing    Nasal obstruction    External and internal nasal valve collapse    Nay Beverly reviewed MRI evaluated the patient    Facial Plastic and Reconstructive Surgery Consultation    Referring Provider:  Jose F Rock MD    HPI:   I had the pleasure of seeing Gabriele Márquez today in clinic for consultation for facial paralysis and nasal obstruction.    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 I noted however on exam a partial right facial paresis. This is not consistent with any of his history and he and his wife did not notice. I had MRI performed which I reviewed and I also had our head and neck surgeon Nay Beverly review. Neither of us saw a lesion to explain the weakness. Dr. Beverly also examined the patient and noted the right facial weakness in addition to the baseline left facial paralysis.    Gabriele primarily complains of nasal obstruction and would like this addressed. He cannot move much air through his nose and feels like his nose is consistently closed off.     He also describes upper lids and dropped brows interfering with superior visual field and interfering with activities of daily living including reading, driving and watching television.        Review Of Systems  ROS: 10 point ROS neg other than  the symptoms noted above in the HPI.    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       neomycin-polymixin-dexamethasone (MAXITROL) 0.1 % ophthalmic suspension Apply 1-2 drops to ears 4 times daily for 10 days (Patient not taking: Reported on 7/14/2020) 5 mL 0     Nkda [no known drug allergies]  Social History     Socioeconomic History     Marital status:      Spouse name: Not on file     Number of children: Not on file     Years of education: Not on file     Highest education level: Not on file   Occupational History     Not on file   Social Needs     Financial resource strain: Not on file     Food insecurity     Worry: Not on file     Inability: Not on file     Transportation needs     Medical: Not on file     Non-medical: Not on file   Tobacco Use     Smoking status: Never Smoker     Smokeless tobacco: Never Used   Substance and Sexual Activity     Alcohol use: Yes     Comment: one per week     Drug use: No     Sexual activity: Yes     Partners: Female     Birth control/protection: Female Surgical, None   Lifestyle     Physical activity     Days per week: Not on file     Minutes per session: Not on file     Stress: Not on file   Relationships     Social connections     Talks on phone: Not on file     Gets together: Not on file     Attends Adventism service: Not on file     Active  member of club or organization: Not on file     Attends meetings of clubs or organizations: Not on file     Relationship status: Not on file     Intimate partner violence     Fear of current or ex partner: Not on file     Emotionally abused: Not on file     Physically abused: Not on file     Forced sexual activity: Not on file   Other Topics Concern     Not on file   Social History Narrative     Not on file     Family History   Problem Relation Age of Onset     Glaucoma No family hx of      Macular Degeneration No family hx of        PE:  Alert and Oriented, Answering Questions Appropriately  Atraumatic, Normocephalic, Face asymmetric  Left complete facial paralysis, with static facial suspension  Atrophy of the midface soft tissues bilaterally with almost complete external nasal valve collapse  The ala are thin and almost fully pinched bilaterally.  He has paresis on the right with mostly midface paralysis  Bilateral brow paralysis with brow ptosis and hooding of skin over the lashes bilaterally, when the brow is supported, vision improves substantially bilaterally   No masses or lesions palpable bilaterally.       Imaging: MRI reviewed once again      IMPRESSION:  Facial paralysis, left  Facial paresis right  Nasal obstruction  Nasal valve collapse, bilaterally severe  Brow ptosis  Visual field obstruction    The encounter diagnosis was Morbid obesity (H).   Known issue that I take into account for medical decisions, no current exacerbation or acute concern but needs to be considered for surgery and surgical risk          PLAN:    Visual field testing    Repair of nasal valve collapse: will need large reconstruction with large grafts laterally, will approach through the nose as nasal alar suspension through the face with fascia is not his preferred method with the discussion of risks with his radiation and previously operated area    Tumor board for evaluation of paresis on right: Discussion resulted in MRI review  and enlarged parotid on right with no obvious facial nerve lesion, radiation oncology did not believe right weakness would be attributable to past neutron beam radiation. They have recommended repeat MRI.    We will repeat it 6 months from the prior exam if not sooner if neurologic changes develop.    No orders of the defined types were placed in this encounter.        Photodocumentation was obtained.     I spent a total of 40 minutes face-to-face with indoo.rs Mode during today's office visit.  Over 50% of this time was spent counseling the patient and/or coordinating care regarding complex medical presentation with multiple morbidities.  See note for details.          Again, thank you for allowing me to participate in the care of your patient.      Sincerely,    Harriet Dudley MD

## 2020-10-22 ENCOUNTER — PREP FOR PROCEDURE (OUTPATIENT)
Dept: OTOLARYNGOLOGY | Facility: CLINIC | Age: 65
End: 2020-10-22

## 2020-10-22 DIAGNOSIS — J34.89 NASAL OBSTRUCTION: ICD-10-CM

## 2020-10-22 DIAGNOSIS — J34.829 NASAL VALVE COLLAPSE: Primary | ICD-10-CM

## 2020-10-22 DIAGNOSIS — M95.0 NASAL DEFORMITY: ICD-10-CM

## 2020-10-22 RX ORDER — CEFAZOLIN SODIUM 1 G/50ML
1 INJECTION, SOLUTION INTRAVENOUS SEE ADMIN INSTRUCTIONS
Status: CANCELLED | OUTPATIENT
Start: 2020-10-22

## 2020-10-22 RX ORDER — CEFAZOLIN SODIUM IN 0.9 % NACL 3 G/100 ML
3 INTRAVENOUS SOLUTION, PIGGYBACK (ML) INTRAVENOUS
Status: CANCELLED | OUTPATIENT
Start: 2020-10-22

## 2020-10-22 NOTE — NURSING NOTE
Will work to obtain PA for Septorhinoplasty, Repair of Valve Collapse with Cadaver Rib.    Pt's wife will call me and let me know whether or not their primary Ophthalmologist performs VFTs.  If not, we'll get Gabriele scheduled for VFTs in the [facility].    Angelica Aguirre RN  10/22/2020 4:15 PM

## 2020-10-23 ENCOUNTER — TELEPHONE (OUTPATIENT)
Dept: OTOLARYNGOLOGY | Facility: CLINIC | Age: 65
End: 2020-10-23

## 2020-10-23 DIAGNOSIS — G51.0 FACIAL PARALYSIS: ICD-10-CM

## 2020-10-23 DIAGNOSIS — H02.833 DERMATOCHALASIS OF BOTH EYELIDS: ICD-10-CM

## 2020-10-23 DIAGNOSIS — H02.836 DERMATOCHALASIS OF BOTH EYELIDS: ICD-10-CM

## 2020-10-23 DIAGNOSIS — H57.813 BROW PTOSIS, BILATERAL: Primary | ICD-10-CM

## 2020-10-23 PROBLEM — J34.829 NASAL VALVE COLLAPSE: Status: ACTIVE | Noted: 2020-10-23

## 2020-10-23 PROBLEM — J34.89 NASAL OBSTRUCTION: Status: ACTIVE | Noted: 2020-10-23

## 2020-10-23 PROBLEM — M95.0 NASAL DEFORMITY: Status: ACTIVE | Noted: 2020-10-23

## 2020-10-23 NOTE — TELEPHONE ENCOUNTER
Left message for pt and wife letting them know that the order for Visual Field Testing has been faxed to [facility] in [place]. Fax # [phone]    Asked that pt or wife call me once VFTs completed so that I can obtain results form [facility].      Angelica Aguirre RN  10/23/2020 2:41 PM

## 2020-10-30 ENCOUNTER — HOSPITAL ENCOUNTER (INPATIENT)
Dept: GENERAL RADIOLOGY | Facility: CLINIC | Age: 65
End: 2020-10-30
Attending: OTOLARYNGOLOGY
Payer: COMMERCIAL

## 2020-11-12 PROBLEM — G51.0: Status: ACTIVE | Noted: 2020-11-12

## 2020-11-12 PROBLEM — E66.01 MORBID OBESITY (H): Status: ACTIVE | Noted: 2020-11-12

## 2020-11-30 DIAGNOSIS — G51.0 FACIAL PARALYSIS: Primary | ICD-10-CM

## 2020-12-02 ENCOUNTER — TELEPHONE (OUTPATIENT)
Dept: OTOLARYNGOLOGY | Facility: CLINIC | Age: 65
End: 2020-12-02

## 2020-12-02 NOTE — TELEPHONE ENCOUNTER
[facility] Call Center    Phone Message    May a detailed message be left on voicemail: yes     Reason for Call: Other:   Fabiola is calling to  left by David. Fabiola is wanting to know if and why they are redoing pt's MRI? Please follow-up.     Action Taken: Other:  ent    Travel Screening: Not Applicable

## 2020-12-02 NOTE — TELEPHONE ENCOUNTER
LVM with number to [facility] in [place] for pt to call and schedule open faced MRI's, per provider request.    Pt should complete MR SINUS FACE W/O & W CONTRAST and MR BRAIN W/O & W CONTRAST at the end of Feb 2021.    Provider will fax orders to [facility].    Number provided to [facility] ([phone])and call center number left for any additional questions.

## 2020-12-02 NOTE — TELEPHONE ENCOUNTER
Informed patient's wife that the MRI is routine surveillance imaging for his right sided facial weakness. Patient's wife expressed understanding, and will call [facility] to schedule.    Tashia Gooden, EMT

## 2020-12-03 ENCOUNTER — TRANSFERRED RECORDS (OUTPATIENT)
Dept: HEALTH INFORMATION MANAGEMENT | Facility: CLINIC | Age: 65
End: 2020-12-03

## 2020-12-03 ENCOUNTER — DOCUMENTATION ONLY (OUTPATIENT)
Dept: OTOLARYNGOLOGY | Facility: CLINIC | Age: 65
End: 2020-12-03

## 2020-12-03 NOTE — PROGRESS NOTES
Pt to have follow up imaging (Brain and Sinus Face MR) performed in Feb 2021.    Orders faxed to [facility] in [place] [phone]    Angelica Aguirre RN  12/3/2020 10:14 AM

## 2020-12-08 ENCOUNTER — TELEPHONE (OUTPATIENT)
Dept: OTOLARYNGOLOGY | Facility: CLINIC | Age: 65
End: 2020-12-08

## 2021-01-04 DIAGNOSIS — H57.813 BROW PTOSIS, BILATERAL: Primary | ICD-10-CM

## 2021-01-04 DIAGNOSIS — H02.839 DERMATOCHALASIA: ICD-10-CM

## 2021-01-04 DIAGNOSIS — H53.40 VISUAL FIELD DEFECT: ICD-10-CM

## 2021-01-04 RX ORDER — CEFAZOLIN SODIUM IN 0.9 % NACL 3 G/100 ML
3 INTRAVENOUS SOLUTION, PIGGYBACK (ML) INTRAVENOUS
Status: CANCELLED | OUTPATIENT
Start: 2021-01-04

## 2021-01-04 RX ORDER — CEFAZOLIN SODIUM 1 G/50ML
1 INJECTION, SOLUTION INTRAVENOUS SEE ADMIN INSTRUCTIONS
Status: CANCELLED | OUTPATIENT
Start: 2021-01-04

## 2021-01-11 ENCOUNTER — HOSPITAL ENCOUNTER (OUTPATIENT)
Facility: CLINIC | Age: 66
End: 2021-01-11
Attending: OTOLARYNGOLOGY | Admitting: OTOLARYNGOLOGY
Payer: COMMERCIAL

## 2021-01-11 DIAGNOSIS — H57.813 BROW PTOSIS, BILATERAL: ICD-10-CM

## 2021-01-11 DIAGNOSIS — H53.40 VISUAL FIELD DEFECT: ICD-10-CM

## 2021-01-11 DIAGNOSIS — H02.839 DERMATOCHALASIA: ICD-10-CM

## 2021-01-20 ENCOUNTER — TELEPHONE (OUTPATIENT)
Dept: OTOLARYNGOLOGY | Facility: CLINIC | Age: 66
End: 2021-01-20

## 2021-01-20 NOTE — TELEPHONE ENCOUNTER
Talked with patients wife regarding scheduling surgery with Dr. Flores Hinkle. Discussed potential date options in the summer, June vs August. Patients wife indicates that patient does not get any sick time off of work so they would like to schedule around their scheduled vacation. Writer explained that due to covid-19 schedules can change this summer. Informed patient wife surgery at the hospital timing will depend on the increase of covid cases. Wife was understanding. She has direct phone number. Writer reassured her we will reach out once scheduling becomes available after 3/31. She has no further questions at this time.

## 2021-02-25 ENCOUNTER — TRANSFERRED RECORDS (OUTPATIENT)
Dept: HEALTH INFORMATION MANAGEMENT | Facility: CLINIC | Age: 66
End: 2021-02-25

## 2021-03-01 ENCOUNTER — TELEPHONE (OUTPATIENT)
Dept: OTOLARYNGOLOGY | Facility: CLINIC | Age: 66
End: 2021-03-01

## 2021-03-01 NOTE — TELEPHONE ENCOUNTER
Called patient to schedule surgery and talked with patients wife about summer dates. Wife has elected to move forward with June for surgery vs August.     Surgeon: Dr. Harriet Hinkle   Date of Surgery: 06/29/2021  Location of surgery: [place] [facility]  Pre-Op H&P: PAC appt virtually, as patient doesn't have time off of work.   Post-Op Appt Date: 1 week   Imaging needed:  No  Discussed COVID-19 testing:  Yes- regardless of immunization status.   Pre-cert/Authorization completed:  Yes  Packet sent out: Yes 03/01/21  Encouraged Fabiola to review packet and call with any questions or concerns.      Gricel Gooden

## 2021-03-01 NOTE — TELEPHONE ENCOUNTER
FUTURE VISIT INFORMATION      SURGERY INFORMATION:    Date: 21    Location: [facility] OR     Surgeon:  Harriet Dudley MD    Anesthesia Type:  General    Procedure:Septorhinoplasty, Repair of Nasal Valve Collapse    Consult: 6.15.21    RECORDS REQUESTED FROM:       Primary Care Provider: Hilario Vasquez     Most recent EKG+ [id number]     Most recent ECHO: 6..19 Astrid

## 2021-03-29 ENCOUNTER — TELEPHONE (OUTPATIENT)
Dept: OTOLARYNGOLOGY | Facility: CLINIC | Age: 66
End: 2021-03-29

## 2021-03-29 NOTE — TELEPHONE ENCOUNTER
Records Requested  03/29/21    Facility  [facility]  Phone: [phone]   Outcome * 3/29/21 11:38 AM Called out to [facility] for images to be pushed to [facility] PACs. - Charis    2/25/21 - MRI Brain/Sinus W/WO

## 2021-05-31 DIAGNOSIS — Z11.59 ENCOUNTER FOR SCREENING FOR OTHER VIRAL DISEASES: ICD-10-CM

## 2021-06-02 ENCOUNTER — TELEPHONE (OUTPATIENT)
Dept: OTOLARYNGOLOGY | Facility: CLINIC | Age: 66
End: 2021-06-02

## 2021-06-02 NOTE — TELEPHONE ENCOUNTER
"Pt called regarding her message to \"delmis\" Dr. Flores Hinkle's nurse. Discussed with DEREK Dominguez regarding short term disability paperwork that was faxed by Gabriele' employer. Informed Fabiola that we do not have the disability paperwork here at the ENT clinic as she could not remember what number she requested it to be faxed too. Informed her that she should fax to [phone] as that is the [facility] fax number and Angelica will be there tomorrow. She was understanding and does not need a phone call back.       Gricel Gooden on 6/2/2021 at 4:04 PM    " patient

## 2021-06-11 ENCOUNTER — TELEPHONE (OUTPATIENT)
Dept: OTOLARYNGOLOGY | Facility: CLINIC | Age: 66
End: 2021-06-11

## 2021-06-11 ENCOUNTER — TELEPHONE (OUTPATIENT)
Dept: SURGERY | Facility: CLINIC | Age: 66
End: 2021-06-11

## 2021-06-11 NOTE — TELEPHONE ENCOUNTER
Left message for Fabiola stating that Gabriele' appt for virtual visit needs to be rescheduled as provider has gone on an unexpected medical leave. Asked Fabiola to call back writer for rescheduling this appt.       Gricel Gooden on 6/11/2021 at 11:51 AM

## 2021-06-11 NOTE — TELEPHONE ENCOUNTER
Please see additional encounter.   Patient was scheduled for 6/23 at 400pm. Patients wife is aware of appt.     Gricel Gooden on 6/11/2021 at 11:59 AM

## 2021-06-11 NOTE — TELEPHONE ENCOUNTER
[facility] Call Center    Phone Message    May a detailed message be left on voicemail: yes     Reason for Call: Other: Disregard . Error.      Action Taken: Message routed to:  [facility] PAC    Travel Screening: Not Applicable

## 2021-06-11 NOTE — TELEPHONE ENCOUNTER
Talked with Fabiola, rescheduled PAC appt virtually to 4:00pm on Wednesday 6/23  They will plan on getting covid-19 test scheduled at [facility] locally on 6/25 (Friday).  She has no further questions.     Gricel Gooden on 6/11/2021 at 11:57 AM

## 2021-06-14 ENCOUNTER — DOCUMENTATION ONLY (OUTPATIENT)
Dept: OTOLARYNGOLOGY | Facility: CLINIC | Age: 66
End: 2021-06-14

## 2021-06-14 NOTE — PROGRESS NOTES
Disability and FMLA paperwork faxed to     Marley Haro at [employer] [facility]  Fax # [phone]    And Tk Flor at [employer] fax # [phone]    Angelica Aguirre RN  6/14/2021 11:11 AM

## 2021-06-15 ENCOUNTER — PRE VISIT (OUTPATIENT)
Dept: SURGERY | Facility: CLINIC | Age: 66
End: 2021-06-15

## 2021-06-23 ENCOUNTER — PRE VISIT (OUTPATIENT)
Dept: SURGERY | Facility: CLINIC | Age: 66
End: 2021-06-23

## 2021-06-28 ENCOUNTER — ANESTHESIA EVENT (OUTPATIENT)
Dept: SURGERY | Facility: CLINIC | Age: 66
End: 2021-06-28
Payer: COMMERCIAL

## 2021-06-29 ENCOUNTER — HOSPITAL ENCOUNTER (OUTPATIENT)
Facility: CLINIC | Age: 66
Setting detail: OBSERVATION
Discharge: HOME | End: 2021-06-30
Attending: OTOLARYNGOLOGY | Admitting: OTOLARYNGOLOGY
Payer: COMMERCIAL

## 2021-06-29 ENCOUNTER — ANESTHESIA (OUTPATIENT)
Dept: SURGERY | Facility: CLINIC | Age: 66
End: 2021-06-29
Payer: COMMERCIAL

## 2021-06-29 DIAGNOSIS — G89.18 ACUTE POST-OPERATIVE PAIN: ICD-10-CM

## 2021-06-29 DIAGNOSIS — M95.0 NASAL DEFORMITY: ICD-10-CM

## 2021-06-29 DIAGNOSIS — J34.89 NASAL OBSTRUCTION: ICD-10-CM

## 2021-06-29 DIAGNOSIS — J34.829 NASAL VALVE COLLAPSE: Primary | ICD-10-CM

## 2021-06-29 DIAGNOSIS — J34.829 NASAL VALVE COLLAPSE: ICD-10-CM

## 2021-06-29 LAB
GLUCOSE BLDC GLUCOMTR-MCNC: 115 MG/DL (ref 70–99)
GLUCOSE BLDC GLUCOMTR-MCNC: 144 MG/DL (ref 70–99)

## 2021-06-29 PROCEDURE — 250N000009 HC RX 250: Performed by: OTOLARYNGOLOGY

## 2021-06-29 PROCEDURE — P9041 ALBUMIN (HUMAN),5%, 50ML: HCPCS | Performed by: STUDENT IN AN ORGANIZED HEALTH CARE EDUCATION/TRAINING PROGRAM

## 2021-06-29 PROCEDURE — 370N000017 HC ANESTHESIA TECHNICAL FEE, PER MIN: Performed by: OTOLARYNGOLOGY

## 2021-06-29 PROCEDURE — 278N000051 HC OR IMPLANT GENERAL: Performed by: OTOLARYNGOLOGY

## 2021-06-29 PROCEDURE — 710N000010 HC RECOVERY PHASE 1, LEVEL 2, PER MIN: Performed by: OTOLARYNGOLOGY

## 2021-06-29 PROCEDURE — 999N000141 HC STATISTIC PRE-PROCEDURE NURSING ASSESSMENT: Performed by: OTOLARYNGOLOGY

## 2021-06-29 PROCEDURE — 250N000011 HC RX IP 250 OP 636: Performed by: OTOLARYNGOLOGY

## 2021-06-29 PROCEDURE — 250N000011 HC RX IP 250 OP 636: Performed by: STUDENT IN AN ORGANIZED HEALTH CARE EDUCATION/TRAINING PROGRAM

## 2021-06-29 PROCEDURE — 250N000013 HC RX MED GY IP 250 OP 250 PS 637: Performed by: STUDENT IN AN ORGANIZED HEALTH CARE EDUCATION/TRAINING PROGRAM

## 2021-06-29 PROCEDURE — 250N000009 HC RX 250: Performed by: STUDENT IN AN ORGANIZED HEALTH CARE EDUCATION/TRAINING PROGRAM

## 2021-06-29 PROCEDURE — 258N000003 HC RX IP 258 OP 636: Performed by: STUDENT IN AN ORGANIZED HEALTH CARE EDUCATION/TRAINING PROGRAM

## 2021-06-29 PROCEDURE — 999N001017 HC STATISTIC GLUCOSE BY METER IP

## 2021-06-29 PROCEDURE — 250N000025 HC SEVOFLURANE, PER MIN: Performed by: OTOLARYNGOLOGY

## 2021-06-29 PROCEDURE — G0378 HOSPITAL OBSERVATION PER HR: HCPCS

## 2021-06-29 PROCEDURE — 360N000076 HC SURGERY LEVEL 3, PER MIN: Performed by: OTOLARYNGOLOGY

## 2021-06-29 PROCEDURE — 272N000001 HC OR GENERAL SUPPLY STERILE: Performed by: OTOLARYNGOLOGY

## 2021-06-29 PROCEDURE — 258N000003 HC RX IP 258 OP 636: Performed by: ANESTHESIOLOGY

## 2021-06-29 DEVICE — IMPLANTABLE DEVICE: Type: IMPLANTABLE DEVICE | Site: NOSE | Status: FUNCTIONAL

## 2021-06-29 RX ORDER — DIPHENHYDRAMINE HCL 25 MG
25 CAPSULE ORAL EVERY 6 HOURS PRN
Status: DISCONTINUED | OUTPATIENT
Start: 2021-06-29 | End: 2021-06-30 | Stop reason: HOSPADM

## 2021-06-29 RX ORDER — ONDANSETRON 4 MG/1
4 TABLET, ORALLY DISINTEGRATING ORAL EVERY 6 HOURS PRN
Status: DISCONTINUED | OUTPATIENT
Start: 2021-06-29 | End: 2021-06-30 | Stop reason: HOSPADM

## 2021-06-29 RX ORDER — DIPHENHYDRAMINE HYDROCHLORIDE 50 MG/ML
25 INJECTION INTRAMUSCULAR; INTRAVENOUS EVERY 6 HOURS PRN
Status: DISCONTINUED | OUTPATIENT
Start: 2021-06-29 | End: 2021-06-30 | Stop reason: HOSPADM

## 2021-06-29 RX ORDER — PHENYLEPHRINE HCL IN 0.9% NACL 50MG/250ML
.1-1 PLASTIC BAG, INJECTION (ML) INTRAVENOUS CONTINUOUS
Status: DISCONTINUED | OUTPATIENT
Start: 2021-06-29 | End: 2021-06-29 | Stop reason: HOSPADM

## 2021-06-29 RX ORDER — NALOXONE HYDROCHLORIDE 0.4 MG/ML
0.4 INJECTION, SOLUTION INTRAMUSCULAR; INTRAVENOUS; SUBCUTANEOUS
Status: DISCONTINUED | OUTPATIENT
Start: 2021-06-29 | End: 2021-06-30 | Stop reason: HOSPADM

## 2021-06-29 RX ORDER — AMOXICILLIN 250 MG
1 CAPSULE ORAL 2 TIMES DAILY
Status: DISCONTINUED | OUTPATIENT
Start: 2021-06-29 | End: 2021-06-30 | Stop reason: HOSPADM

## 2021-06-29 RX ORDER — SODIUM CHLORIDE, SODIUM LACTATE, POTASSIUM CHLORIDE, CALCIUM CHLORIDE 600; 310; 30; 20 MG/100ML; MG/100ML; MG/100ML; MG/100ML
INJECTION, SOLUTION INTRAVENOUS CONTINUOUS
Status: DISCONTINUED | OUTPATIENT
Start: 2021-06-29 | End: 2021-06-29 | Stop reason: HOSPADM

## 2021-06-29 RX ORDER — CEFAZOLIN SODIUM 1 G/3ML
1 INJECTION, POWDER, FOR SOLUTION INTRAMUSCULAR; INTRAVENOUS SEE ADMIN INSTRUCTIONS
Status: DISCONTINUED | OUTPATIENT
Start: 2021-06-29 | End: 2021-06-29 | Stop reason: HOSPADM

## 2021-06-29 RX ORDER — MUPIROCIN 20 MG/G
OINTMENT TOPICAL 2 TIMES DAILY
Status: DISCONTINUED | OUTPATIENT
Start: 2021-06-29 | End: 2021-06-30 | Stop reason: HOSPADM

## 2021-06-29 RX ORDER — OXYCODONE HYDROCHLORIDE 5 MG/1
5 TABLET ORAL EVERY 4 HOURS PRN
Status: DISCONTINUED | OUTPATIENT
Start: 2021-06-29 | End: 2021-06-30 | Stop reason: HOSPADM

## 2021-06-29 RX ORDER — BENZALKONIUM CHLORIDE 1.3 MG/ML
CLOTH TOPICAL
COMMUNITY
Start: 2021-06-22

## 2021-06-29 RX ORDER — NALOXONE HYDROCHLORIDE 0.4 MG/ML
0.2 INJECTION, SOLUTION INTRAMUSCULAR; INTRAVENOUS; SUBCUTANEOUS
Status: DISCONTINUED | OUTPATIENT
Start: 2021-06-29 | End: 2021-06-30 | Stop reason: HOSPADM

## 2021-06-29 RX ORDER — LIDOCAINE 40 MG/G
CREAM TOPICAL
Status: DISCONTINUED | OUTPATIENT
Start: 2021-06-29 | End: 2021-06-29 | Stop reason: HOSPADM

## 2021-06-29 RX ORDER — LIDOCAINE HYDROCHLORIDE 20 MG/ML
INJECTION, SOLUTION INFILTRATION; PERINEURAL PRN
Status: DISCONTINUED | OUTPATIENT
Start: 2021-06-29 | End: 2021-06-29

## 2021-06-29 RX ORDER — PROPOFOL 10 MG/ML
INJECTION, EMULSION INTRAVENOUS PRN
Status: DISCONTINUED | OUTPATIENT
Start: 2021-06-29 | End: 2021-06-29

## 2021-06-29 RX ORDER — FENTANYL CITRATE 50 UG/ML
INJECTION, SOLUTION INTRAMUSCULAR; INTRAVENOUS PRN
Status: DISCONTINUED | OUTPATIENT
Start: 2021-06-29 | End: 2021-06-29

## 2021-06-29 RX ORDER — LEVOTHYROXINE SODIUM 150 UG/1
150 TABLET ORAL
Status: DISCONTINUED | OUTPATIENT
Start: 2021-06-30 | End: 2021-06-30 | Stop reason: HOSPADM

## 2021-06-29 RX ORDER — DEXAMETHASONE SODIUM PHOSPHATE 4 MG/ML
INJECTION, SOLUTION INTRA-ARTICULAR; INTRALESIONAL; INTRAMUSCULAR; INTRAVENOUS; SOFT TISSUE PRN
Status: DISCONTINUED | OUTPATIENT
Start: 2021-06-29 | End: 2021-06-29

## 2021-06-29 RX ORDER — HYDROMORPHONE HYDROCHLORIDE 1 MG/ML
.3-.5 INJECTION, SOLUTION INTRAMUSCULAR; INTRAVENOUS; SUBCUTANEOUS EVERY 10 MIN PRN
Status: DISCONTINUED | OUTPATIENT
Start: 2021-06-29 | End: 2021-06-29 | Stop reason: HOSPADM

## 2021-06-29 RX ORDER — OXYCODONE HYDROCHLORIDE 10 MG/1
10 TABLET ORAL EVERY 4 HOURS PRN
Status: DISCONTINUED | OUTPATIENT
Start: 2021-06-29 | End: 2021-06-30 | Stop reason: HOSPADM

## 2021-06-29 RX ORDER — OXYMETAZOLINE HYDROCHLORIDE 0.05 G/100ML
SPRAY NASAL PRN
Status: DISCONTINUED | OUTPATIENT
Start: 2021-06-29 | End: 2021-06-29 | Stop reason: HOSPADM

## 2021-06-29 RX ORDER — LIDOCAINE HYDROCHLORIDE AND EPINEPHRINE 10; 10 MG/ML; UG/ML
INJECTION, SOLUTION INFILTRATION; PERINEURAL PRN
Status: DISCONTINUED | OUTPATIENT
Start: 2021-06-29 | End: 2021-06-29 | Stop reason: HOSPADM

## 2021-06-29 RX ORDER — FENTANYL CITRATE 50 UG/ML
25-50 INJECTION, SOLUTION INTRAMUSCULAR; INTRAVENOUS
Status: DISCONTINUED | OUTPATIENT
Start: 2021-06-29 | End: 2021-06-29 | Stop reason: HOSPADM

## 2021-06-29 RX ORDER — CALCIUM CARBONATE 500 MG/1
500 TABLET, CHEWABLE ORAL DAILY PRN
Status: DISCONTINUED | OUTPATIENT
Start: 2021-06-29 | End: 2021-06-30 | Stop reason: HOSPADM

## 2021-06-29 RX ORDER — EPHEDRINE SULFATE 50 MG/ML
INJECTION, SOLUTION INTRAMUSCULAR; INTRAVENOUS; SUBCUTANEOUS PRN
Status: DISCONTINUED | OUTPATIENT
Start: 2021-06-29 | End: 2021-06-29

## 2021-06-29 RX ORDER — ONDANSETRON 2 MG/ML
4 INJECTION INTRAMUSCULAR; INTRAVENOUS EVERY 30 MIN PRN
Status: DISCONTINUED | OUTPATIENT
Start: 2021-06-29 | End: 2021-06-29 | Stop reason: HOSPADM

## 2021-06-29 RX ORDER — PROCHLORPERAZINE MALEATE 10 MG
10 TABLET ORAL EVERY 6 HOURS PRN
Status: DISCONTINUED | OUTPATIENT
Start: 2021-06-29 | End: 2021-06-30 | Stop reason: HOSPADM

## 2021-06-29 RX ORDER — POLYETHYLENE GLYCOL 3350 17 G/17G
17 POWDER, FOR SOLUTION ORAL DAILY
Status: DISCONTINUED | OUTPATIENT
Start: 2021-06-30 | End: 2021-06-30 | Stop reason: HOSPADM

## 2021-06-29 RX ORDER — NALOXONE HYDROCHLORIDE 0.4 MG/ML
0.2 INJECTION, SOLUTION INTRAMUSCULAR; INTRAVENOUS; SUBCUTANEOUS
Status: DISCONTINUED | OUTPATIENT
Start: 2021-06-29 | End: 2021-06-29 | Stop reason: HOSPADM

## 2021-06-29 RX ORDER — ACETAMINOPHEN 325 MG/1
975 TABLET ORAL ONCE
Status: DISCONTINUED | OUTPATIENT
Start: 2021-06-29 | End: 2021-06-29 | Stop reason: HOSPADM

## 2021-06-29 RX ORDER — NALOXONE HYDROCHLORIDE 0.4 MG/ML
0.4 INJECTION, SOLUTION INTRAMUSCULAR; INTRAVENOUS; SUBCUTANEOUS
Status: DISCONTINUED | OUTPATIENT
Start: 2021-06-29 | End: 2021-06-29 | Stop reason: HOSPADM

## 2021-06-29 RX ORDER — ONDANSETRON 2 MG/ML
4 INJECTION INTRAMUSCULAR; INTRAVENOUS EVERY 6 HOURS PRN
Status: DISCONTINUED | OUTPATIENT
Start: 2021-06-29 | End: 2021-06-30 | Stop reason: HOSPADM

## 2021-06-29 RX ORDER — BISACODYL 10 MG
10 SUPPOSITORY, RECTAL RECTAL DAILY PRN
Status: DISCONTINUED | OUTPATIENT
Start: 2021-06-29 | End: 2021-06-30 | Stop reason: HOSPADM

## 2021-06-29 RX ORDER — CEFAZOLIN SODIUM IN 0.9 % NACL 3 G/100 ML
3 INTRAVENOUS SOLUTION, PIGGYBACK (ML) INTRAVENOUS
Status: COMPLETED | OUTPATIENT
Start: 2021-06-29 | End: 2021-06-29

## 2021-06-29 RX ORDER — ONDANSETRON 4 MG/1
4 TABLET, ORALLY DISINTEGRATING ORAL EVERY 30 MIN PRN
Status: DISCONTINUED | OUTPATIENT
Start: 2021-06-29 | End: 2021-06-29 | Stop reason: HOSPADM

## 2021-06-29 RX ORDER — LIDOCAINE 40 MG/G
CREAM TOPICAL
Status: DISCONTINUED | OUTPATIENT
Start: 2021-06-29 | End: 2021-06-30 | Stop reason: HOSPADM

## 2021-06-29 RX ORDER — ONDANSETRON 2 MG/ML
INJECTION INTRAMUSCULAR; INTRAVENOUS PRN
Status: DISCONTINUED | OUTPATIENT
Start: 2021-06-29 | End: 2021-06-29

## 2021-06-29 RX ORDER — DOCUSATE SODIUM 100 MG/1
100 CAPSULE, LIQUID FILLED ORAL 2 TIMES DAILY
Status: DISCONTINUED | OUTPATIENT
Start: 2021-06-29 | End: 2021-06-30 | Stop reason: HOSPADM

## 2021-06-29 RX ORDER — ACETAMINOPHEN 325 MG/1
650 TABLET ORAL EVERY 4 HOURS PRN
Status: DISCONTINUED | OUTPATIENT
Start: 2021-07-02 | End: 2021-06-30 | Stop reason: HOSPADM

## 2021-06-29 RX ORDER — ALBUMIN, HUMAN INJ 5% 5 %
SOLUTION INTRAVENOUS CONTINUOUS PRN
Status: DISCONTINUED | OUTPATIENT
Start: 2021-06-29 | End: 2021-06-29

## 2021-06-29 RX ORDER — ACETAMINOPHEN 325 MG/1
975 TABLET ORAL EVERY 8 HOURS
Status: DISCONTINUED | OUTPATIENT
Start: 2021-06-29 | End: 2021-06-30 | Stop reason: HOSPADM

## 2021-06-29 RX ORDER — MEPERIDINE HYDROCHLORIDE 25 MG/ML
12.5 INJECTION INTRAMUSCULAR; INTRAVENOUS; SUBCUTANEOUS
Status: DISCONTINUED | OUTPATIENT
Start: 2021-06-29 | End: 2021-06-29 | Stop reason: HOSPADM

## 2021-06-29 RX ADMIN — CEPHALEXIN 250 MG: 250 CAPSULE ORAL at 23:59

## 2021-06-29 RX ADMIN — PHENYLEPHRINE HYDROCHLORIDE 100 MCG: 10 INJECTION INTRAVENOUS at 14:18

## 2021-06-29 RX ADMIN — PHENYLEPHRINE HYDROCHLORIDE 200 MCG: 10 INJECTION INTRAVENOUS at 13:46

## 2021-06-29 RX ADMIN — CEPHALEXIN 250 MG: 250 CAPSULE ORAL at 18:37

## 2021-06-29 RX ADMIN — Medication 0.3 MCG/KG/MIN: at 14:48

## 2021-06-29 RX ADMIN — FENTANYL CITRATE 50 MCG: 50 INJECTION, SOLUTION INTRAMUSCULAR; INTRAVENOUS at 15:41

## 2021-06-29 RX ADMIN — ROCURONIUM BROMIDE 20 MG: 10 INJECTION INTRAVENOUS at 15:00

## 2021-06-29 RX ADMIN — PROPOFOL 180 MG: 10 INJECTION, EMULSION INTRAVENOUS at 13:18

## 2021-06-29 RX ADMIN — FENTANYL CITRATE 100 MCG: 50 INJECTION, SOLUTION INTRAMUSCULAR; INTRAVENOUS at 13:38

## 2021-06-29 RX ADMIN — PROPOFOL 50 MG: 10 INJECTION, EMULSION INTRAVENOUS at 14:34

## 2021-06-29 RX ADMIN — Medication 10 MG: at 13:44

## 2021-06-29 RX ADMIN — Medication 5 MG: at 13:31

## 2021-06-29 RX ADMIN — PHENYLEPHRINE HYDROCHLORIDE 100 MCG: 10 INJECTION INTRAVENOUS at 15:28

## 2021-06-29 RX ADMIN — DOCUSATE SODIUM 100 MG: 100 CAPSULE, LIQUID FILLED ORAL at 20:44

## 2021-06-29 RX ADMIN — PHENYLEPHRINE HYDROCHLORIDE 100 MCG: 10 INJECTION INTRAVENOUS at 13:55

## 2021-06-29 RX ADMIN — SODIUM CHLORIDE, POTASSIUM CHLORIDE, SODIUM LACTATE AND CALCIUM CHLORIDE: 600; 310; 30; 20 INJECTION, SOLUTION INTRAVENOUS at 13:08

## 2021-06-29 RX ADMIN — LIDOCAINE HYDROCHLORIDE 100 MG: 20 INJECTION, SOLUTION INFILTRATION; PERINEURAL at 13:18

## 2021-06-29 RX ADMIN — Medication 5 MG: at 14:18

## 2021-06-29 RX ADMIN — SUGAMMADEX 300 MG: 100 INJECTION, SOLUTION INTRAVENOUS at 15:48

## 2021-06-29 RX ADMIN — ACETAMINOPHEN 975 MG: 325 TABLET, FILM COATED ORAL at 18:36

## 2021-06-29 RX ADMIN — PHENYLEPHRINE HYDROCHLORIDE 200 MCG: 10 INJECTION INTRAVENOUS at 13:28

## 2021-06-29 RX ADMIN — PHENYLEPHRINE HYDROCHLORIDE 200 MCG: 10 INJECTION INTRAVENOUS at 14:00

## 2021-06-29 RX ADMIN — ROCURONIUM BROMIDE 60 MG: 10 INJECTION INTRAVENOUS at 13:18

## 2021-06-29 RX ADMIN — DOCUSATE SODIUM 50 MG AND SENNOSIDES 8.6 MG 1 TABLET: 8.6; 5 TABLET, FILM COATED ORAL at 20:44

## 2021-06-29 RX ADMIN — Medication 3 G: at 13:23

## 2021-06-29 RX ADMIN — DEXAMETHASONE SODIUM PHOSPHATE 4 MG: 4 INJECTION, SOLUTION INTRA-ARTICULAR; INTRALESIONAL; INTRAMUSCULAR; INTRAVENOUS; SOFT TISSUE at 13:26

## 2021-06-29 RX ADMIN — PHENYLEPHRINE HYDROCHLORIDE 100 MCG: 10 INJECTION INTRAVENOUS at 13:44

## 2021-06-29 RX ADMIN — Medication 5 MG: at 13:55

## 2021-06-29 RX ADMIN — ALBUMIN (HUMAN): 12.5 SOLUTION INTRAVENOUS at 13:58

## 2021-06-29 RX ADMIN — ONDANSETRON 4 MG: 2 INJECTION INTRAMUSCULAR; INTRAVENOUS at 15:29

## 2021-06-29 RX ADMIN — FENTANYL CITRATE 50 MCG: 50 INJECTION, SOLUTION INTRAMUSCULAR; INTRAVENOUS at 13:15

## 2021-06-29 RX ADMIN — ROCURONIUM BROMIDE 20 MG: 10 INJECTION INTRAVENOUS at 14:35

## 2021-06-29 RX ADMIN — ROCURONIUM BROMIDE 20 MG: 10 INJECTION INTRAVENOUS at 13:38

## 2021-06-29 RX ADMIN — MUPIROCIN: 20 OINTMENT TOPICAL at 20:45

## 2021-06-29 ASSESSMENT — MIFFLIN-ST. JEOR
SCORE: 2329
SCORE: 2352

## 2021-06-29 NOTE — ANESTHESIA CARE TRANSFER NOTE
Patient: Gabriele JUAREZ Mode    Procedure(s):  Septorhinoplasty, Repair of Nasal Valve Collapse  Cadaver Donor Rib    Diagnosis: Nasal valve collapse [J34.89]  Nasal obstruction [J34.89]  Nasal deformity [M95.0]  Diagnosis Additional Information: No value filed.    Anesthesia Type:   General     Note:    Oropharynx: oropharynx clear of all foreign objects and spontaneously breathing  Level of Consciousness: awake  Oxygen Supplementation: face mask    Independent Airway: airway patency satisfactory and stable  Dentition: dentition unchanged  Vital Signs Stable: post-procedure vital signs reviewed and stable  Report to RN Given: handoff report given  Patient transferred to: PACU  Comments: Patient transferred to PACU in stable condition, breathing spontaneously on face mask, VSS.  Report given to RN.  Handoff Report: Identifed the Patient, Identified the Reponsible Provider, Reviewed the pertinent medical history, Discussed the surgical course, Reviewed Intra-OP anesthesia mangement and issues during anesthesia, Set expectations for post-procedure period and Allowed opportunity for questions and acknowledgement of understanding      Vitals: (Last set prior to Anesthesia Care Transfer)  CRNA VITALS  6/29/2021 1532 - 6/29/2021 1606      6/29/2021             Pulse:  94    SpO2:  96 %        Electronically Signed By: MERRILL Wilson CRNA  June 29, 2021  4:06 PM

## 2021-06-29 NOTE — ANESTHESIA PROCEDURE NOTES
Airway       Patient location during procedure: OR  Staff -        CRNA: Marin Zazueta APRN CRNA       Performed By: CRNA  Consent for Airway        Urgency: elective  Indications and Patient Condition       Indications for airway management: ramu-procedural       Induction type:intravenous       Mask difficulty assessment: 2 - vent by mask + OA or adjuvant +/- NMBA    Final Airway Details       Final airway type: endotracheal airway       Successful airway: ETT - single, Oral and LILIANA  Endotracheal Airway Details        ETT size (mm): 8.0       Cuffed: yes       Successful intubation technique: video laryngoscopy       VL Blade Size: MAC 4       Grade View of Cords: 1       Adjucts: stylet       Position: Right       Measured from: lips       Secured at (cm): 22       Bite block used: None    Post intubation assessment        Placement verified by: capnometry, equal breath sounds and chest rise        Number of attempts at approach: 1       Secured with: pink tape       Ease of procedure: easy       Dentition: Intact and Unchanged    Medication(s) Administered   Medication Administration Time: 6/29/2021 1:19 PM

## 2021-06-29 NOTE — ANESTHESIA POSTPROCEDURE EVALUATION
Patient: Gabriele JUAREZ Mode    Procedure(s):  Septorhinoplasty, Repair of Nasal Valve Collapse  Cadaver Donor Rib    Diagnosis:Nasal valve collapse [J34.89]  Nasal obstruction [J34.89]  Nasal deformity [M95.0]  Diagnosis Additional Information: No value filed.    Anesthesia Type:  General    Note:  Disposition: Outpatient   Postop Pain Control: Uneventful            Sign Out: Well controlled pain   PONV: No   Neuro/Psych: Uneventful            Sign Out: Acceptable/Baseline neuro status   Airway/Respiratory: Uneventful            Sign Out: Acceptable/Baseline resp. status   CV/Hemodynamics: Uneventful            Sign Out: Acceptable CV status; No obvious hypovolemia; No obvious fluid overload   Other NRE: NONE   DID A NON-ROUTINE EVENT OCCUR? No           Last vitals:  Vitals:    06/29/21 1712 06/29/21 1721 06/29/21 1730   BP: 134/73  132/74   Pulse: 91  92   Resp: 16  16   Temp: 36.3  C (97.4  F)     SpO2: 96% 99% 94%       Last vitals prior to Anesthesia Care Transfer:  CRNA VITALS  6/29/2021 1532 - 6/29/2021 1632      6/29/2021             NIBP:  138/79    Pulse:  96    NIBP Mean:  90    Temp:  37.3  C (99.2  F)    SpO2:  96 %    Resp Rate (observed):  18    EKG:  Sinus rhythm          Electronically Signed By: Neri Johns MD  June 29, 2021  5:39 PM

## 2021-06-29 NOTE — BRIEF OP NOTE
[facility]    Brief Operative Note    Pre-operative diagnosis: Nasal valve collapse [J34.89]  Nasal obstruction [J34.89]  Nasal deformity [M95.0]  Post-operative diagnosis Same as pre-operative diagnosis    Procedure: Procedure(s):  Septorhinoplasty, Repair of Nasal Valve Collapse  Cadaver Donor Rib  Surgeon: Surgeon(s) and Role:     * Harriet Dudley MD - Primary  Anesthesia: General   Estimated blood loss: 75 cc  Drains: None  Specimens: * No specimens in log *  Findings:   Caudal malpositioning and weak lateral crura. Narrow internal nasal valve bilaterally.  Complications: None.  Implants:   Implant Name Type Inv. Item Serial No.  Lot No. LRB No. Used Action   costal cartilage    [id number] [employer] 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     Neuro:  - Pain control: Scheduled acetaminophen, oxycodone PRN    HEENT:  - Nasal saline on POD1  - Bactroban to incisions  - nasal precautions    Respiratory:  - supplemental O2 PRN to keep sats >92%  - no CPAP    CV/heme:  - Hemodynamically stable    FEN/GI:  - Regular diet  - Bowel regimen: senna/miralax    :  - monitor for urinary retention    Endo  - PTA synthroid    ID:  - prophylactic Keflex for 7 days     PPX:  - SCDs    Dispo: Overnight observation    -- Patient and above plan discussed with Dr. Octavia Rizvi MD  Otolaryngology-Head & Neck Surgery PGY-4  Please contact ENT with questions by dialing * [phone] and entering job code [phone] when prompted.

## 2021-06-29 NOTE — OP NOTE
SURGEON:  Harriet Dudley MD   ASSISTANT SURGEON: Preston Rizvi MD       TITLE OF OPERATION:                       Septorhinoplasty    Bilateral inferior turbinate reduction and outfracture.    Nasal valve repair, bilateral.  Extensive nasal reconstruction with homograft costal cartilage      INDICATIONS:      Gabriele Márquez is a 65 year old patient with a significant history of nasal obstruction that has led to significant dysfunction and symptoms. Physical examination in clinic demonstrated notable structural deformities requiring a surgical correction for improvement of function. These would not be amenable to medical therapy or by septoplasty. The structural deficits involve the septum in its direct relationship to the bony and cartilaginous nasal framework.     The risks and benefits of the procedure were discussed in clinic but also in the preoperative area. The risks discussed included bleeding with need for intervention, scarring, infection, shifting of the nasal framework, incomplete correction of nasal obstruction, contour deformities, vasomotor rhinitis and changes to the external appearance of the nose. The possibility of future need for additional procedures was also discussed. We also discussed the post operative care and expected course.     All questions were answered and the patient signed consent for the above mentioned procedures.     PREOPERATIVE DIAGNOSES:   Nasal obstruction.   Nasal valve stenosis.     Bilateral inferior turbinate hypertrophy  Left facial paralysis      POSTOPERATIVE DIAGNOSES:     Nasal obstruction.   Nasal valve stenosis.     Bilateral inferior turbinate hypertrophy   Left facial paralysis      ANESTHESIA:    General endotracheal anesthesia  Local 1% lidocaine with 1:100,000 epinephrine 10 cc's   Topical Afrin intranasally on pledgets      IMPLANT DEVICES:   Bilateral Epstein splints, Bilateral Silastic alar splints, Aquaplast nasal splint over the nasal dorsum.        SPECIMENS:  None.       COMPLICATIONS:  None.       BLOOD LOSS:  75 mL        SURGEON'S NARRATIVE:       FINDINGS:  The patient had a significant external and internal nasal valve stenosis and bilateral turbinate hypertrophy.     Grafts:   Bilateral  grafts, septal cartilage  Caudal septal extension graft, septal cartilage   Dorsal onlay graft, septal cartilage  Bilateral lateral crural strut grafts (alar replacement grafts), costal cartilage    Osteotomies:   None      DESCRIPTION OF PROCEDURE:      The patient was brought back to the operating room by the Anesthesiology staff. They were laid supine on the operating room table and induced into general anesthesia with the endotracheal tube secured at the midline of the chin.  The head of the bed was then turned 90 degrees towards the surgical team.  Arms were tucked at the side with all pressure points appropriately padded.  A time-out procedure was performed with all members of the operating room staff in agreement of the site of surgery, the patient identification and surgery to be performed. The nasal block was then performed with 1% lidocaine with 1:100,000 epinephrine and four-percent cocaine nasal pledgets were placed topically into bilateral nasal passages. The face was prepped and draped in usual sterile fashion with ophthalmic diluted Betadine.  The eyes were taped with Tegaderm.  Subsequently, surgery began.       A columellar incision was made with an #11 blade scalpel, and subsequently marginal incisions were made with a #15 blade.  The soft tissue envelope in a sub-SMAS plane was elevated off of the lower lateral cartilages.  This exposed the dome and bilateral lower lateral cartilages.  Subsequently, dissection was followed cranially.  This was done in the sub-SMAS plane leading to exposure of the scroll region and bilateral upper lateral cartilages.  Once we reached the edge of the nasal bones, a Onofre elevator was used to transition the  elevation to a subperiosteal plane.  Subsequently, we divided the domes bilaterally to expose the caudal edge of the septum and the anterior septal angle.       Bilateral mucoperichondrial flaps were elevated using a #15 blade scalpel. Once we elevated the bilateral flaps, we performed the septoplasty after the upper lateral cartilages were divided off of the dorsal septum.      A 1.3 mm L-strut dorsally and caudally was preserved of the septum, and subsequently the septum was harvested. Once this was harvested, the mucosa was then reapproximated with a mattress quilting stitch with a 4-0 chromic in a running fashion.     Irradiated homograft costal cartilage was used for reconstruction and graft material. This was rinsed multiple times on the back table per provided instructions.     Two  grafts were fashioned from the harvested septal cartilage. These were placed in between the dorsal septum and the upper lateral cartilage.  They were sutured in place with mattress 5-0 PDS sutures.  The upper lateral cartilages were then resuspended onto the complex. The caudal septum was then approached and set medially. A caudal septal extension graft was placed to support the tip structure.    Bilateral inferior turbinates were then reduced submucosally with the bipolar turbinate cautery and outfractured     The soft tissue envelope was then redraped over the framework.  This demonstrated that the nose was nice and straight and found that the tip had appropriate support. The medial crura were then reapproximated with through-and-through 4-0 plain gut sutures on a Bakari needle.  Subcutaneous pockets were dissected along the alar rim bilaterally down to pyriform aperture. Bilateral lateral alar strut grafts using costal cartilage were placed. The dome was set in this position, also. The columellar incision was closed with interrupted 6-0 Monocryl sutures.  Bilateral marginal incisions were then closed with interrupted 4-0  chromic sutures.  The nose was suctioned, the nasopharynx was suctioned, the oropharynx was suctioned, and the stomach was suctioned and emptied of its contents.       We then placed bilateral Epstein splints with Bactroban ointment.  Mastisol and Steri-Strip were placed over the nasal dorsum.  The Aquaplast nasal splint was then placed above that. 0.25% Bupivacaine was the injected, 2 cc's for a lasting nasal block. This completed the procedure.      The patient tolerated the procedure well.  There were no complications. The patient was extubated and taken to recovery following commands and breathing spontaneously.     Dr. Ley was present and participated in all critical portions of the procedure.       Preston Rizvi MD  Otolaryngology-Head & Neck Surgery PGY-4  Please contact ENT by dialing * [phone] and entering job code [phone].     I was present, scrubbed for the entire procedure and performed key aspects. I agree with the note.     JOLENE LEY MD

## 2021-06-29 NOTE — ANESTHESIA PREPROCEDURE EVALUATION
Anesthesia Pre-Procedure Evaluation    Patient: Gabriele Márquez   MRN: [id number] : 1955        Preoperative Diagnosis: Nasal valve collapse [J34.89]  Nasal obstruction [J34.89]  Nasal deformity [M95.0]   Procedure : Procedure(s):  Septorhinoplasty, Repair of Nasal Valve Collapse  Cadaver Donor Rib     Past Medical History:   Diagnosis Date     Cancer (H)      Diabetes (H)      Reduced vision      Sleep apnea      Sleep apnea     C PAP     Thyroid disease       Past Surgical History:   Procedure Laterality Date     CATARACT IOL, RT/LT Left      ENT SURGERY       HEAD & NECK SURGERY       IMPLANT GOLD WEIGHT EYELID Left 2016    Procedure: IMPLANT WEIGHT EYELID;  Surgeon: Adithya Lynch MD;  Location: [facility]      Allergies   Allergen Reactions     Nkda [No Known Drug Allergies]       Social History     Tobacco Use     Smoking status: Never Smoker     Smokeless tobacco: Never Used   Substance Use Topics     Alcohol use: Yes     Comment: one per week      Wt Readings from Last 1 Encounters:   21 149.6 kg (329 lb 12.9 oz)        Anesthesia Evaluation            ROS/MED HX  ENT/Pulmonary:     (+) sleep apnea, uses CPAP,     Neurologic:  - neg neurologic ROS     Cardiovascular:  - neg cardiovascular ROS     METS/Exercise Tolerance:     Hematologic:  - neg hematologic  ROS     Musculoskeletal:  - neg musculoskeletal ROS     GI/Hepatic:  - neg GI/hepatic ROS     Renal/Genitourinary:  - neg Renal ROS     Endo:     (+) type II DM, thyroid problem, hypothyroidism,     Psychiatric/Substance Use:  - neg psychiatric ROS     Infectious Disease:       Malignancy:       Other:            Physical Exam    Airway        Mallampati: IV   TM distance: > 3 FB   Neck ROM: full   Mouth opening: < 3 cm    Respiratory Devices and Support         Dental           Cardiovascular   cardiovascular exam normal          Pulmonary   pulmonary exam normal                OUTSIDE LABS:  CBC: No results found for: WBC, HGB, HCT,  PLT  BMP: No results found for: NA, POTASSIUM, CHLORIDE, CO2, BUN, CR, GLC  COAGS: No results found for: PTT, INR, FIBR  POC:   Lab Results   Component Value Date     (H) 06/29/2021     HEPATIC: No results found for: ALBUMIN, PROTTOTAL, ALT, AST, GGT, ALKPHOS, BILITOTAL, BILIDIRECT, SUSSY  OTHER: No results found for: PH, LACT, A1C, RADHA, PHOS, MAG, LIPASE, AMYLASE, TSH, T4, T3, CRP, SED    Anesthesia Plan    ASA Status:  2      Anesthesia Type: General.     - Airway: ETT   Induction: Intravenous.   Maintenance: Inhalation.   Techniques and Equipment:     - Airway: Video-Laryngoscope         Consents    Anesthesia Plan(s) and associated risks, benefits, and realistic alternatives discussed. Questions answered and patient/representative(s) expressed understanding.     - Discussed with:  Patient         Postoperative Care    Pain management: Multi-modal analgesia.   PONV prophylaxis: Ondansetron (or other 5HT-3)     Comments:                Keara Muñoz MD

## 2021-06-30 VITALS
OXYGEN SATURATION: 93 % | TEMPERATURE: 97.3 F | HEART RATE: 68 BPM | HEIGHT: 72 IN | BODY MASS INDEX: 42.66 KG/M2 | WEIGHT: 315 LBS | DIASTOLIC BLOOD PRESSURE: 83 MMHG | RESPIRATION RATE: 16 BRPM | SYSTOLIC BLOOD PRESSURE: 125 MMHG

## 2021-06-30 LAB — GLUCOSE BLDC GLUCOMTR-MCNC: 98 MG/DL (ref 70–99)

## 2021-06-30 PROCEDURE — 999N001017 HC STATISTIC GLUCOSE BY METER IP

## 2021-06-30 PROCEDURE — G0378 HOSPITAL OBSERVATION PER HR: HCPCS

## 2021-06-30 PROCEDURE — 250N000013 HC RX MED GY IP 250 OP 250 PS 637: Performed by: STUDENT IN AN ORGANIZED HEALTH CARE EDUCATION/TRAINING PROGRAM

## 2021-06-30 RX ORDER — OXYCODONE HYDROCHLORIDE 5 MG/1
5 TABLET ORAL EVERY 4 HOURS PRN
Qty: 15 TABLET | Refills: 0 | Status: SHIPPED | OUTPATIENT
Start: 2021-06-30

## 2021-06-30 RX ORDER — ACETAMINOPHEN 325 MG/1
650 TABLET ORAL EVERY 4 HOURS PRN
Qty: 100 TABLET | Refills: 0 | Status: SHIPPED | OUTPATIENT
Start: 2021-07-02

## 2021-06-30 RX ORDER — MUPIROCIN 20 MG/G
OINTMENT TOPICAL 2 TIMES DAILY
Qty: 22 G | Refills: 3 | Status: SHIPPED | OUTPATIENT
Start: 2021-06-30

## 2021-06-30 RX ORDER — ONDANSETRON 4 MG/1
4 TABLET, ORALLY DISINTEGRATING ORAL EVERY 6 HOURS PRN
Qty: 5 TABLET | Refills: 0 | Status: SHIPPED | OUTPATIENT
Start: 2021-06-30

## 2021-06-30 RX ADMIN — LEVOTHYROXINE SODIUM 150 MCG: 150 TABLET ORAL at 07:59

## 2021-06-30 RX ADMIN — DOCUSATE SODIUM 100 MG: 100 CAPSULE, LIQUID FILLED ORAL at 07:59

## 2021-06-30 RX ADMIN — CEPHALEXIN 250 MG: 250 CAPSULE ORAL at 06:08

## 2021-06-30 RX ADMIN — MUPIROCIN: 20 OINTMENT TOPICAL at 08:04

## 2021-06-30 RX ADMIN — DOCUSATE SODIUM 50 MG AND SENNOSIDES 8.6 MG 1 TABLET: 8.6; 5 TABLET, FILM COATED ORAL at 07:59

## 2021-06-30 RX ADMIN — SALINE NASAL SPRAY 2 SPRAY: 1.5 SOLUTION NASAL at 07:59

## 2021-06-30 RX ADMIN — ACETAMINOPHEN 975 MG: 325 TABLET, FILM COATED ORAL at 02:13

## 2021-06-30 NOTE — PLAN OF CARE
Arrived [facility]:  [facility]  Belongings/meds:  With family  2 RN Skin Assessment Completed by:  Yvon and SINDY  Non-intact findings documented (yes/no/NA): Nasal packing, scabs throughout. Otherwise intact.     Status: POD #0 Septorhinoplasty, Bilateral inferior turbinate reduction and outfracture, Nasal valve repair, bilateral, Extensive nasal reconstruction with homograft costal cartilage  Vitals: Stable on 2L O2 oxymask  Neuros: A&O x4. L ptosis and facial droop at baseline. L Tribe worse than R.   IV: PIV SL.   Resp/trach: LS clear.   Diet: REg diet; good intake.   Bowel status: BS+  : Voiding spontaneously  Skin: Epstein splints to nose and nasal drainage, MD aware. Nasal sling in place.   Pain: headache controlled with Tylenol   Activity: Up with 1, GB   Social: Wife at bedside this shift.   Plan: Nasal precautions maintained. Obs notice given. Continue to monitor.

## 2021-06-30 NOTE — PLAN OF CARE
Observation goals: Delayed Recovery from Anesthesia PRIOR TO DISCHARGE     Comments: List all goals to be met before discharge home:   -Return to baseline mental status: Met  -Hypercapnia, hypoventilation or hypoxia resolved for at least 2 hours without supplemental oxygen: Not Met; 2L OxyPlus Mask  -Deficits in sensation, mobility or coordination have resolved if spinal or regional anesthesia was used: Met

## 2021-06-30 NOTE — PROGRESS NOTES
Otolaryngology Progress Note  June 30, 2021    S: No acute events overnight. Was on 2L oxymask but just weaned off this morning and has been doing well without supplemental oxygen. Denies any significant pain. Mild nasal drainage but slowing.     O: /89 (BP Location: Left arm)   Pulse 88   Temp 97  F (36.1  C) (Axillary)   Resp 16   Ht 1.829 m (6')   Wt (!) 151.9 kg (334 lb 14.1 oz)   SpO2 98%   BMI 45.42 kg/m     General: Alert and oriented x 3, No acute distress   HEENT: EOMI. HB 6/6 on the left, HB 1/6 on the right except for the right midface segment which is paretic. Aquaplast splint in place. Silastic alar splints in place. Scant bloody drainage anteriorly. Posterior oropharynx is clear.     Pulmonary: Breathing non-labored, no stridor, no accessory muscle use.    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      Neuro:  - Pain control: Scheduled acetaminophen, oxycodone PRN     HEENT:  - Nasal saline on POD1  - Bactroban to incisions  - nasal precautions     Respiratory:  - supplemental O2 PRN to keep sats >92%  - no CPAP     CV/heme:  - Hemodynamically stable     FEN/GI:  - Regular diet  - Bowel regimen: senna/miralax     :  - monitor for urinary retention     Endo  - PTA synthroid     ID:  - prophylactic Keflex for 7 days      PPX:  - SCDs     Dispo: Overnight observation, discharge today as long as does well off O2     -- Patient and above plan to be discussed with Dr. Octavia Rizvi MD  Otolaryngology-Head & Neck Surgery PGY-4  Please contact ENT with questions by dialing * [phone] and entering job code [phone] when prompted.

## 2021-06-30 NOTE — DISCHARGE SUMMARY
Pt discharged to home with spouse. PIV removed. Medications sent to discharge pharmacy. Discharge instructions completed, next due medication times communicated. Pt was sent home with extra gauze, tape, and an oxymask.

## 2021-06-30 NOTE — PLAN OF CARE
Status: POD #1 Septorhinoplasty, Bilateral inferior turbinate reduction and outfracture, Nasal valve repair, bilateral, Extensive nasal reconstruction with homograft costal cartilage. Observation status  Vitals: VSS on RA. Weaned off O2 at 6A  Neuros: A&O x4. L ptosis and facial droop at baseline.   IV: PIV SL   Resp/trach: LS diminished, denies SOB.   Diet: Regular   Bowel status: BS+, no BM this shift.   : Voiding spontaneously  Skin: Epstein splints to nose. Nose continues to have small amount of bright bloody drainage throughout shit. Nasal sling in place.   Pain: denied  Activity: SBA  Plan: Nasal precautions maintained. Obs notice given. Continue to monitor.     Observation goals: Delayed Recovery from Anesthesia PRIOR TO DISCHARGE     Comments: List all goals to be met before discharge home:   -Return to baseline mental status: Met  -Hypercapnia, hypoventilation or hypoxia resolved for at least 2 hours without supplemental oxygen: Not Met- weaned off O2 at 0600  -Deficits in sensation, mobility or coordination have resolved if spinal or regional anesthesia was used: Met

## 2021-06-30 NOTE — DISCHARGE SUMMARY
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     It was recommended that he undergo operative intervention and the patient consented to the above procedure after detailed explanation of the risks and benefits of said procedure.    Hospital Course: The patient was admitted to the hospital and underwent the above mentioned procedure. He tolerated the procedure without any intra- or ramu-operative complications. Please see the operative report for full details of the procedure. The patient was admitted for post-operative monitoring. His postoperative course was uneventful. He was on 2L Oxymask overnight but was able to wean off this morning. He has oxygen at home and was provided a face mask to use at night until his follow up. At discharge, the patient's pain was well controlled, the patient was voiding on his own, and he was ambulating and tolerating a regular diet.     Discharge Exam:  Vitals:    06/30/21 0006 06/30/21 0614 06/30/21 0732 06/30/21 0758   BP: 116/89  125/83    BP Location: Left arm  Right arm    Pulse: 88  68    Resp: 16  16    Temp: 97  F (36.1  C)  97.3  F (36.3  C)    TempSrc: Axillary  Oral    SpO2: 99% 98% 98% 93%   Weight:       Height:           General: Alert and oriented x 3, No acute distress  HEENT: EOMI. HB 6/6 on the left, HB 1/6 on the right except for the right midface segment which paretic. Aquaplast splint in place. Silastic alar splints in place. Scant bloody drainage  anteriorly. Posterior oropharynx is clear.    Pulmonary: Breathing non-labored, no stridor, no accessory muscle use.    Discharge Medications:   Gabriele Márquez   Home Medication Instructions MOHINDER:[id number]    Printed on:06/30/21 1127   Medication Information                      acetaminophen (TYLENOL) 325 MG tablet  Take 2 tablets (650 mg) by mouth every 4 hours as needed for mild pain             cephALEXin (KEFLEX) 250 MG capsule  Take 1 capsule (250 mg) by mouth every 6 hours for 7 days             levothyroxine (SYNTHROID/LEVOTHROID) 150 MCG tablet  Take 150 mcg by mouth             mupirocin (BACTROBAN) 2 % external ointment  Apply topically 2 times daily             ondansetron (ZOFRAN-ODT) 4 MG ODT tab  Take 1 tablet (4 mg) by mouth every 6 hours as needed for nausea or vomiting             oxyCODONE (ROXICODONE) 5 MG tablet  Take 1 tablet (5 mg) by mouth every 4 hours as needed for moderate to severe pain             Respiratory Therapy Supplies (CARETOUCH CPAP & BIPAP HOSE) MISC  BIPAP machine for home use at pressure: 14/9 cm/h2O with 4 liters of oxygen , Heated humidifier x 1 every 5 years, Humidifier chamber x 1 every 6 months, Full face mask with cushion x 1 every 3 months, Full face cushion 1 every month, 1 headgear 1 every 6 month, Heated tubing x 1 every 3 months, Filters: Disposable filter x 2 a month, non-disposable filters x1 every 6 months; chinstrap 1 every 6 months  Length of Need: 99 months, Frequency of use: Daily             sodium chloride (OCEAN) 0.65 % nasal spray  Spray 2 sprays into both nostrils 4 times daily             VITAMIN D PO  Take  by mouth.                 Discharge Procedure Orders   Reason for your hospital stay   Order Comments: Postoperative monitoring     Adult [facility] [place] Follow-up and recommended labs and tests   Order Comments: Follow up in ENT clinic with Dr. Dudley on 7/7/21 at 11:00. Please call the clinic with questions/concerns:  "[phone].    Otolaryngology/ENT Clinic:  [facility]  [facility]  [address] [place] [address]     Activity   Order Comments: No heavy lifting greater than 10 lbs and no strenuous exercise for 2 weeks or until follow up appointment. No driving while taking narcotic pain medications.     Order Specific Question Answer Comments   Is discharge order? Yes      When to contact your care team   Order Comments: Please notify your doctor if you experience wound breakdown, sustained bleeding from the wound site, or increasing redness, swelling, and/or purulent malorodorous discharge from the wound site which may indicate infection. If you feel it is acute, or experience sudden changes in breathing, chest pain, or excessive sleepiness/somnolence please return to the emergency department or call 911. If you have questions or concerns during the day please call ENT clinic and [phone]. If at night you can call [facility] at [phone] and ask for the \"ENT resident on call\".     Discharge Instructions   Order Comments: Nasal precautions: no nose blowing, cough or sneeze with mouth open, no CPAP  Use Oxymask on 2L/min at night until seen in follow up     Diet   Order Comments: Follow this diet upon discharge: Orders Placed This Encounter      Regular Diet Adult     Order Specific Question Answer Comments   Is discharge order? Yes        Dispo: To home in good condition. All of the patient's questions/concerns have been addressed at this time.     Preston Rizvi MD  Otolaryngology-Head & Neck Surgery PGY4  Please contact ENT by dialing * [phone] and entering job code [phone].      I, Harriet Dudley MD, saw and evaluated this patient prior to discharge. I discussed the patient with the resident and agree with plan of care as documented in the resident note. I personally spent 5 minutes on discharge activities.    "

## 2021-07-01 ENCOUNTER — PATIENT OUTREACH (OUTPATIENT)
Dept: CARE COORDINATION | Facility: CLINIC | Age: 66
End: 2021-07-01

## 2021-07-01 DIAGNOSIS — Z71.89 OTHER SPECIFIED COUNSELING: ICD-10-CM

## 2021-07-01 NOTE — PROGRESS NOTES
Clinic Care Coordination Contact  [facility]/Voicemail    Clinical Data: Care Coordinator Outreach- Transition Call Management    Outreach attempted x 1.  Left message on wife's voicemail with call back information and requested return call.    Plan: [facility] team will try to reach patient again in 1-2 business days.    Coby Varela MA  [facility], [facility]

## 2021-07-02 ENCOUNTER — TELEPHONE (OUTPATIENT)
Dept: OTOLARYNGOLOGY | Facility: CLINIC | Age: 66
End: 2021-07-02

## 2021-07-02 NOTE — PROGRESS NOTES
[facility]: Post-Discharge Note  SITUATION                                                      Admission:    Admission Date: 06/29/21   Reason for Admission: (Nasal valve collapse )  Discharge:   Discharge Date: 06/30/21  Discharge Diagnosis: (Nasal valve collapse )    BACKGROUND                                                      Septorhinoplasty, Repair of Nasal Valve Collapse  Cadaver Donor Rib  The patient was admitted to the hospital and underwent the above mentioned procedure. He tolerated the procedure without any intra- or ramu-operative complications. Please see the operative report for full details of the procedure. The patient was admitted for post-operative monitoring. His postoperative course was uneventful. He was on 2L Oxymask overnight but was able to wean off this morning. He has oxygen at home and was provided a face mask to use at night until his follow up. At discharge, the patient's pain was well controlled, the patient was voiding on his own, and he was ambulating and tolerating a regular diet.     ASSESSMENT      Discharge Assessment  Patient reports symptoms are: Improved  Does the patient have all of their medications?: Yes  Does patient know what their new medications are for?: Yes  Does patient have a follow-up appointment scheduled?: Yes  Does patient have any other questions or concerns?: No    Post-op  Did the patient have surgery or a procedure: Yes  Incision: other  Drainage: No  Bleeding: none  Fever: No  Chills: No  Redness: No  Warmth: No  Swelling: No  Incision site pain: No  Eating & Drinking: eating and drinking without complaints/concerns  PO Intake: regular diet  Bowel Function: normal  Date of last BM: 07/02/21  Urinary Status: voiding without complaint/concerns        PLAN                                                      Outpatient Plan Follow up in ENT clinic with Dr. Dudley on 7/7/21 at 11:00. Please call the clinic with questions/concerns:  [phone].       Future Appointments   Date Time Provider Department Center   7/7/2021 11:00 AM Harriet Dudley MD [facility]           Coby Varela MA

## 2021-07-02 NOTE — PROGRESS NOTES
Clinic Care Coordination Contact  [facility]/Voicemail    Clinical Data: Care Coordinator Outreach- Transition Call Management    Outreach attempted x 2.   as I was writing this note a incoming call came from wife.   Plan: [facility] team will do no further outreaches at this time.    Coby Varela MA  [facility], [facility]

## 2021-07-07 ENCOUNTER — OFFICE VISIT (OUTPATIENT)
Dept: OTOLARYNGOLOGY | Facility: CLINIC | Age: 66
End: 2021-07-07
Payer: COMMERCIAL

## 2021-07-07 VITALS
HEIGHT: 72 IN | BODY MASS INDEX: 42.66 KG/M2 | OXYGEN SATURATION: 95 % | WEIGHT: 315 LBS | TEMPERATURE: 98.4 F | HEART RATE: 81 BPM

## 2021-07-07 DIAGNOSIS — Z98.890 POSTOPERATIVE STATE: Primary | ICD-10-CM

## 2021-07-07 PROCEDURE — 99024 POSTOP FOLLOW-UP VISIT: CPT | Performed by: OTOLARYNGOLOGY

## 2021-07-07 ASSESSMENT — MIFFLIN-ST. JEOR: SCORE: 2329.87

## 2021-07-07 ASSESSMENT — PAIN SCALES - GENERAL: PAINLEVEL: NO PAIN (0)

## 2021-07-07 NOTE — NURSING NOTE
Chief Complaint   Patient presents with     RECHECK     1 week post op       Pulse 81, temperature 98.4  F (36.9  C), temperature source Temporal, height 1.829 m (6'), weight 149.7 kg (330 lb), SpO2 95 %.    Wolfgang Corrigan, EMT

## 2021-07-07 NOTE — PROGRESS NOTES
Facial Plastic and Reconstructive Surgery      Gabriele Márquez is doing well.  He notes significant improvement in nasal breathing.     He is healing appropriately.  I will see him in three months.

## 2021-07-07 NOTE — LETTER
7/7/2021       RE: Gabriele Márquez  [address] [address]     Dear Colleague,    Thank you for referring your patient, Gabriele Márquez, to the [facility] EAR NOSE AND THROAT CLINIC [place] at [facility]. Please see a copy of my visit note below.    Facial Plastic and Reconstructive Surgery      Gabriele Márquez is doing well.  He notes significant improvement in nasal breathing.     He is healing appropriately.  I will see him in three months.       Again, thank you for allowing me to participate in the care of your patient.      Sincerely,    Harriet Dudley MD

## 2021-07-09 NOTE — NURSING NOTE
POD # 8 s/p Septorhinoplasty, Repair of Nasal Valve Collapse, Donor Rib.    Nasal cast and both sets of nasal splints removed.    Excess crusting and drainage suctioned out of nostrils.    Pt is pleased with the improvement in his breathing.    Pt given large nose cones to wear prn for a few hours at a time throughout the day and to wear at night time.    F/u in one month.    Angelica Aguirre RN  7/9/2021 3:28 PM

## 2021-07-23 NOTE — ADDENDUM NOTE
Addendum  created 07/23/21 0746 by Reyes Lizarraga MD    Attestation recorded in Intraprocedure, Intraprocedure Attestations filed

## 2021-07-28 ENCOUNTER — ALLIED HEALTH/NURSE VISIT (OUTPATIENT)
Dept: OTOLARYNGOLOGY | Facility: CLINIC | Age: 66
End: 2021-07-28
Payer: COMMERCIAL

## 2021-07-28 DIAGNOSIS — Z98.890 POSTOPERATIVE STATE: Primary | ICD-10-CM

## 2021-07-28 PROCEDURE — 99207 PR NO CHARGE NURSE ONLY: CPT

## 2021-07-28 NOTE — PROGRESS NOTES
"Pt comes in PO 6/29/21 s/p Septorhinoplasty, Bilateral Turbinate Reduction, Nasal Valve Repair with Homograft Costal Cartilage.    Pt continues to wear nose cones prn throughout the day and wears them to bed every night.    Nasal passageways remain open and pt says that his breathing has been \"good\" since surgery.      Absorbable sutures removed from columellar incision.    Told pt that he may start wearing his CPAP at bedtime and to continue wearing the nose cones prn as he tolerates.    F/u with Dr. Dudley in 2 mos.    Angelica Aguirre RN  7/28/2021 4:09 PM      " 18

## 2021-08-17 ENCOUNTER — TELEPHONE (OUTPATIENT)
Dept: OTOLARYNGOLOGY | Facility: CLINIC | Age: 66
End: 2021-08-17

## 2021-08-17 NOTE — TELEPHONE ENCOUNTER
"Fabiola called back regarding regarding scheduling bilateral blepharoplasty and brow lift with Dr. Flores Hinkle. Discussed with Fabiola the post op restrictions on Blepharoplasty post op cares    \"Exercise Restrictions:  ? Refrain from rigorous exercise for the first 2 weeks after surgery. Do not lift anything  greater than 10 pounds. Avoid activities that would cause you to hold your breath,  which would increase pressure.  ? After 2 weeks from surgery you can slowly and gradually increase your activity\"    Fabiola will talk to patient and see if he would like to proceed with surgery and if so, when .Patient is still recovering from nasal surgery and being off of work and she is unsure if he would like to pursue this at this time. Asked Fabiola to talk it over with Derek and contact us back. Explained that if they do want to move forward, even next year, we will update the insurance company with new date for an extension. Authorization is valid through March 2022. Fabiola will call back.       Gricel Gooden on 8/17/2021 at 11:26 AM      "

## 2021-08-17 NOTE — TELEPHONE ENCOUNTER
Left message for [place] regarding scheduling in the future with Dr. Flores Hinkle. Asked Fabiola and Derek to call back to discuss. Direct number given [phone].      Gricel Gooden on 8/17/2021 at 10:32 AM

## 2021-09-12 ENCOUNTER — HEALTH MAINTENANCE LETTER (OUTPATIENT)
Age: 66
End: 2021-09-12

## 2021-09-22 ENCOUNTER — OFFICE VISIT (OUTPATIENT)
Dept: OTOLARYNGOLOGY | Facility: CLINIC | Age: 66
End: 2021-09-22
Payer: COMMERCIAL

## 2021-09-22 VITALS — WEIGHT: 315 LBS | BODY MASS INDEX: 42.66 KG/M2 | HEIGHT: 72 IN

## 2021-09-22 DIAGNOSIS — Z98.890 POSTOPERATIVE STATE: Primary | ICD-10-CM

## 2021-09-22 PROCEDURE — 99024 POSTOP FOLLOW-UP VISIT: CPT | Performed by: OTOLARYNGOLOGY

## 2021-09-22 ASSESSMENT — MIFFLIN-ST. JEOR: SCORE: 2329.87

## 2021-09-22 ASSESSMENT — PAIN SCALES - GENERAL: PAINLEVEL: NO PAIN (0)

## 2021-09-22 NOTE — NURSING NOTE
Chief Complaint   Patient presents with     RECHECK     2 month follow up       Height 1.829 m (6'), weight 149.7 kg (330 lb).    Wolfgang Corrigan, EMT

## 2021-09-22 NOTE — PROGRESS NOTES
Facial Plastic and Reconstructive Surgery      Gabriele Márquez is doing well  He is healing well from surgery. His nasal wall has great support and his breathing is much improved.    I will see him back in three months

## 2021-09-22 NOTE — LETTER
9/22/2021       RE: Gabriele Márquez  [address] [address]     Dear Colleague,    Thank you for referring your patient, Gabriele Márquez, to the [facility] EAR NOSE AND THROAT CLINIC [place] at [facility]. Please see a copy of my visit note below.    Facial Plastic and Reconstructive Surgery      Gabriele Márquez is doing well  He is healing well from surgery. His nasal wall has great support and his breathing is much improved.    I will see him back in three months       Again, thank you for allowing me to participate in the care of your patient.      Sincerely,    Harriet Dudley MD

## 2021-09-29 NOTE — NURSING NOTE
Pt to f/u with Dr. Dudley in 3 mos.    Pt and wife told that they are due for their annual follow up with Dr. Rock.  Wife states that she has his contact info and will reach out for scheduling.    Angelica Aguirre RN  9/28/2021 7:11 PM

## 2021-12-13 ENCOUNTER — OFFICE VISIT (OUTPATIENT)
Dept: PLASTIC SURGERY | Facility: CLINIC | Age: 66
End: 2021-12-13
Payer: COMMERCIAL

## 2021-12-13 DIAGNOSIS — Z98.890 POSTOPERATIVE STATE: Primary | ICD-10-CM

## 2021-12-13 NOTE — LETTER
February 22, 2022  Re: Gabriele Márquez  1955    Dear Dr. Pearson,    Thank you so much for referring Gabriele Márquez to the [facility]. I had the pleasure of visiting with Gabriele today.     Attached you will find a copy of my note. Please feel free to reach out to me with any questions, [phone].     Facial Plastic and Reconstructive Surgery      Gabriele Márquez comes in for follow up.  His brow is in a more dependent position and it seems as though the glue is not holding. His nasal breathing is much improved and the nasal framework is optimal.    I will see him in 2 months.         Your trust in our practice and care is much appreciated.    Sincerely,  Harriet Dudley MD

## 2021-12-13 NOTE — LETTER
12/13/2021       RE: Gabriele Márquez  [address] [facility] [address]     Dear Colleague,    Thank you for referring your patient, Gab[facility]le [facility]quez[facility]. Please see a copy of my visit note below.    Facial Plastic and Reconstructive Surgery      Gabriele Márquez comes in for follow up.  His brow is in a more dependent position and it seems as though the glue is not holding. His nasal breathing is much improved and the nasal framework is optimal.    I will see him in 2 months.         Harriet Dudley MD

## 2021-12-13 NOTE — LETTER
12/13/2021       RE: Gabriele Márquez  [address] [address]     Dear Colleague,    Thank you for referring your patient, Gabriele M[facility]quez[facility]. Please see a copy of my visit note below.    Facial Plastic and Reconstructive Surgery      Gabriele Márquez comes in for follow up.  His brow is in a more dependent position and it seems as though the glue is not holding. His nasal breathing is much improved and the nasal framework is optimal.    I will see him in 2 months.       Harriet Dudley MD

## 2021-12-14 ENCOUNTER — OFFICE VISIT (OUTPATIENT)
Dept: OTOLARYNGOLOGY | Facility: CLINIC | Age: 66
End: 2021-12-14
Payer: COMMERCIAL

## 2021-12-14 ENCOUNTER — ANCILLARY PROCEDURE (OUTPATIENT)
Dept: GENERAL RADIOLOGY | Facility: CLINIC | Age: 66
End: 2021-12-14
Attending: OTOLARYNGOLOGY
Payer: COMMERCIAL

## 2021-12-14 VITALS — HEIGHT: 72 IN | BODY MASS INDEX: 42.66 KG/M2 | WEIGHT: 315 LBS

## 2021-12-14 DIAGNOSIS — R06.89 DIFFICULTY BREATHING: ICD-10-CM

## 2021-12-14 DIAGNOSIS — R06.89 DIFFICULTY BREATHING: Primary | ICD-10-CM

## 2021-12-14 PROCEDURE — 99213 OFFICE O/P EST LOW 20 MIN: CPT | Performed by: OTOLARYNGOLOGY

## 2021-12-14 PROCEDURE — 71046 X-RAY EXAM CHEST 2 VIEWS: CPT | Mod: GC | Performed by: RADIOLOGY

## 2021-12-14 ASSESSMENT — PAIN SCALES - GENERAL: PAINLEVEL: NO PAIN (0)

## 2021-12-14 ASSESSMENT — MIFFLIN-ST. JEOR: SCORE: 2324.87

## 2021-12-14 NOTE — LETTER
2021       RE: Gabriele Márquez  [address] [address]     Dear Colleague,    Thank you for referring your patient, Gabriele Márquez, to the [facility] EAR NOSE AND THROAT CLINIC [place] at [facility]. Please see a copy of my visit note below.      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 I last saw the patient on 2020, at which time he had no new complaints.     Today, the patient reports that he had recent work done on his nose and this seems to be helping him. The patient's family member states that it seems as though the patient seems to be choking easier more recently.      Review of Systems:   Pertinent items are noted in HPI or as in patient entered ROS below, remainder of complete ROS is negative.    ENT ROS 2020   Constitutional Problems with sleep   Ears, Nose, Throat Trouble swallowing, Hoarseness   Musculoskeletal Back pain   Endocrine Thirst      Physical Exam:   Ht 1.829 m (6')   Wt 149.7 kg (330 lb)   BMI 44.76 kg/m       PHYSICAL EXAMINATION:    Constitutional:  The patient was accompanied, well-groomed, and in no acute distress.    Skin:  Warm and pink.    Neurologic:  Alert and oriented x 3.  CN's III-XII within normal limits.  Voice normal.   Psychiatric:  The patient's affect was calm, cooperative, and appropriate.    Respiratory:  Breathing comfortably without stridor or exertion of accessory muscles.    Eyes: Extraocular movement intact.    Head:  Normocephalic and atraumatic.  No lesions or scars.    Ears:  Pinnae and tragus non-tender.  EAC's and TM's were clear. Deeply embedded dry cerumen present in the left ear.   Nose:  Sinuses were non-tender.  Anterior  rhinoscopy revealed midline septum and absence of purulence or polyps.    OC/OP:  Normal tongue, floor of mouth, buccal mucosa, and palate.  No lesions or masses on inspection or palpation.  No abnormal lymph tissue in the oropharynx.  The pterygoid region is non-tender.    Neck:  Supple with normal laryngeal and tracheal landmarks.  The parotid beds were without masses.  No palpable thyroid.  Lungs: Clear to auscultation without crackles, rales, or rhonchi.   Lymphatic:  There is no palpable lymphadenopathy in the neck.     Imaging:   Chest X-ray (07/14/2020)  IMPRESSION: Negative. No radiographic evidence of metastatic disease.    I have personally reviewed the above imaging and agree with the final impression and findings.     Assessment and Plan:    ICD-10-CM    1. 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 He will restart the once per day mineral oil in his bilateral ears, especially in his left ear. He will continue this for three weeks and then recheck by phone and return if needed. Follow-up in one year.     Follow-up: No follow-ups on file.      Scribe Disclosure:  Patricia ANDRES, am serving as a scribe to document services personally performed by Jose F Rock MD at this visit, based upon the provider's statements to me. All documentation has been reviewed by the aforementioned provider prior to being entered into the official medical record.     Scribe Preparation Attestation:  Patricia ANDRES, a scribe, prepared the chart for today's encounter.       Again, thank you for allowing me to participate in the care of your patient.      Sincerely,    Jose F Rock MD

## 2021-12-14 NOTE — PATIENT INSTRUCTIONS
1. Please follow-up in clinic in 1 year   2. Please call the ENT clinic with any questions,concerns, new or worsening symptoms.    -Clinic number is [phone]   - Mercedes's direct line (Dr. Rock's nurse) [phone]    Complete chest xray and let us know how the mineral oil is working.

## 2021-12-14 NOTE — PROGRESS NOTES
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 I last saw the patient on 2020, at which time he had no new complaints.     Today, the patient reports that he had recent work done on his nose and this seems to be helping him. The patient's family member states that it seems as though the patient seems to be choking easier more recently.      Review of Systems:   Pertinent items are noted in HPI or as in patient entered ROS below, remainder of complete ROS is negative.    ENT ROS 2020   Constitutional Problems with sleep   Ears, Nose, Throat Trouble swallowing, Hoarseness   Musculoskeletal Back pain   Endocrine Thirst      Physical Exam:   Ht 1.829 m (6')   Wt 149.7 kg (330 lb)   BMI 44.76 kg/m       PHYSICAL EXAMINATION:    Constitutional:  The patient was accompanied, well-groomed, and in no acute distress.    Skin:  Warm and pink.    Neurologic:  Alert and oriented x 3.  CN's III-XII within normal limits.  Voice normal.   Psychiatric:  The patient's affect was calm, cooperative, and appropriate.    Respiratory:  Breathing comfortably without stridor or exertion of accessory muscles.    Eyes: Extraocular movement intact.    Head:  Normocephalic and atraumatic.  No lesions or scars.    Ears:  Pinnae and tragus non-tender.  EAC's and TM's were clear. Deeply embedded dry cerumen present in the left ear.   Nose:  Sinuses were non-tender.  Anterior rhinoscopy revealed midline septum and absence of purulence or polyps.    OC/OP:  Normal tongue, floor of mouth, buccal mucosa, and palate.  No lesions or masses on inspection or palpation.  No abnormal lymph tissue in the oropharynx.  The pterygoid region is non-tender.    Neck:  Supple with normal laryngeal and tracheal  landmarks.  The parotid beds were without masses.  No palpable thyroid.  Lungs: Clear to auscultation without crackles, rales, or rhonchi.   Lymphatic:  There is no palpable lymphadenopathy in the neck.     Imaging:   Chest X-ray (07/14/2020)  IMPRESSION: Negative. No radiographic evidence of metastatic disease.    I have personally reviewed the above imaging and agree with the final impression and findings.     Assessment and Plan:    ICD-10-CM    1. 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 He will restart the once per day mineral oil in his bilateral ears, especially in his left ear. He will continue this for three weeks and then recheck by phone and return if needed. Follow-up in one year.     Follow-up: No follow-ups on file.      Scribe Disclosure:  Patricia ANDRES, am serving as a scribe to document services personally performed by Jose F Rock MD at this visit, based upon the provider's statements to me. All documentation has been reviewed by the aforementioned provider prior to being entered into the official medical record.     Scribe Preparation Attestation:  Patricia ANDRES, a scribe, prepared the chart for today's encounter.

## 2022-02-07 ENCOUNTER — ALLIED HEALTH/NURSE VISIT (OUTPATIENT)
Dept: PLASTIC SURGERY | Facility: CLINIC | Age: 67
End: 2022-02-07
Payer: COMMERCIAL

## 2022-02-07 DIAGNOSIS — H57.813 BROW PTOSIS, BILATERAL: Primary | ICD-10-CM

## 2022-02-07 NOTE — PROGRESS NOTES
Pt came in for updated photodocumentation with research coordinator.    Angelica Aguirre RN  2/7/2022 2:18 PM

## 2022-02-22 NOTE — PROGRESS NOTES
Facial Plastic and Reconstructive Surgery      Gabriele JUAREZ Willi comes in for follow up.  His brow is in a more dependent position and it seems as though the glue is not holding. His nasal breathing is much improved and the nasal framework is optimal.    I will see him in 2 months.

## 2022-04-21 ENCOUNTER — TELEPHONE (OUTPATIENT)
Dept: OTOLARYNGOLOGY | Facility: CLINIC | Age: 67
End: 2022-04-21
Payer: COMMERCIAL

## 2022-04-21 DIAGNOSIS — R47.02 DYSPHASIA: Primary | ICD-10-CM

## 2022-04-21 NOTE — TELEPHONE ENCOUNTER
Writer spoke with wife regarding symptoms. Wife states the patient is experiencing this with both liquid and with food. She could not specify which liquids or food and it does not happen every time he eats or drinks.     I placed an order for video swallow and SLP referral to patient. Wife states she is going to check if they can complete the XR video swallow closer to home. If not they will schedule up here with us.     She will call us with an update.     Mercedes Mcgraw RN on 4/22/2022 at 10:46 AM

## 2022-04-21 NOTE — TELEPHONE ENCOUNTER
[facility] Call Center    Phone Message    May a detailed message be left on voicemail: yes     Reason for Call: Symptoms or Concerns     If patient has red-flag symptoms, warm transfer to triage line    Current symptom or concern: Pt's wife Fabiola reports that pt has been having trouble swallowing liquids and sometimes solids.    Symptoms have been present for:  2 month(s)    Has patient previously been seen for this? No    By : Dr. Rock    Date: 12/14/21    Are there any new or worsening symptoms? No      Action Taken: Message routed to:  [facility] ENT    Travel Screening: Not Applicable

## 2022-04-22 ENCOUNTER — MYC MEDICAL ADVICE (OUTPATIENT)
Dept: OTOLARYNGOLOGY | Facility: CLINIC | Age: 67
End: 2022-04-22
Payer: COMMERCIAL

## 2022-04-22 NOTE — TELEPHONE ENCOUNTER
Writer called and LM. I did receive voicemail that they can get the XR video swallow study done closer to home but wanted to confirm if we need to fax the order to the facility or if they can see it within our system.     I will send a MyChart as well.     Mercedes Mcgraw RN on 4/22/2022 at 11:14 AM

## 2022-04-27 ENCOUNTER — MEDICAL CORRESPONDENCE (OUTPATIENT)
Dept: HEALTH INFORMATION MANAGEMENT | Facility: CLINIC | Age: 67
End: 2022-04-27
Payer: COMMERCIAL

## 2022-04-27 ENCOUNTER — TRANSFERRED RECORDS (OUTPATIENT)
Dept: HEALTH INFORMATION MANAGEMENT | Facility: CLINIC | Age: 67
End: 2022-04-27
Payer: COMMERCIAL

## 2022-07-05 ENCOUNTER — TELEPHONE (OUTPATIENT)
Dept: OTOLARYNGOLOGY | Facility: CLINIC | Age: 67
End: 2022-07-05

## 2022-07-05 NOTE — TELEPHONE ENCOUNTER
[facility] Call Center    Phone Message    May a detailed message be left on voicemail: yes     Reason for Call: Other: Patient had a swallow eval done, has an persistant cough and patients spouse wants Dr. Rock to be contacted and wants a call back to discuss. Thank you      Action Taken: Message routed to:  [facility] ENT     Travel Screening: Not Applicable

## 2022-07-06 ENCOUNTER — MYC MEDICAL ADVICE (OUTPATIENT)
Dept: OTOLARYNGOLOGY | Facility: CLINIC | Age: 67
End: 2022-07-06

## 2022-07-06 NOTE — TELEPHONE ENCOUNTER
A [employer] message was sent indicating Dr. Rock is out of office until 7/12/2022 and that they will be followed up with at that time.

## 2022-07-12 ENCOUNTER — VIRTUAL VISIT (OUTPATIENT)
Dept: OTOLARYNGOLOGY | Facility: CLINIC | Age: 67
End: 2022-07-12
Payer: COMMERCIAL

## 2022-07-12 ENCOUNTER — TELEPHONE (OUTPATIENT)
Dept: OTOLARYNGOLOGY | Facility: CLINIC | Age: 67
End: 2022-07-12

## 2022-07-12 VITALS — HEIGHT: 72 IN | BODY MASS INDEX: 42.66 KG/M2 | WEIGHT: 315 LBS

## 2022-07-12 DIAGNOSIS — R47.02 DYSPHASIA: Primary | ICD-10-CM

## 2022-07-12 PROCEDURE — 99213 OFFICE O/P EST LOW 20 MIN: CPT | Mod: 95 | Performed by: OTOLARYNGOLOGY

## 2022-07-12 ASSESSMENT — PAIN SCALES - GENERAL: PAINLEVEL: NO PAIN (0)

## 2022-07-12 NOTE — TELEPHONE ENCOUNTER
Patient was called and wife answered. Confirmed time for telephone call at 130p. Requested [place] land line be used at: [phone]    Anthony Cooley RN

## 2022-07-12 NOTE — TELEPHONE ENCOUNTER
Records Requested  22    Facility  [facility]   [facility] [address]  Fx.  [phone] or [phone] (Vendor)  Medical Records ph. [phone]  Patient MR Number: [id number]   Outcome 2022 Video Swallow Study and CT report scanned in care everywhere ([facility]), sent a fax for images to be mailed out - Amay     *trackin    22 4:20PM : received disc - Amay

## 2022-07-12 NOTE — LETTER
7/12/2022       RE: Gabriele Márquez  [address] [address]     Dear Colleague,    Thank you for referring your patient, Gabriele Márquez, to the [facility] EAR NOSE AND THROAT CLINIC [place] at [facility]. Please see a copy of my visit note below.    Mr. Márquez is 66 years of age and is here for followup.  He had adenoid cystic cancer taken out by Dr. Magdaleno  about 25 years ago.  He has got no other current complaints at the present time today except that he probably had some reflux laryngitis.  He had some speech and swallowing problems along with some aspiration and things of this nature that seem to be associated with maybe some spasm or some other events in the upper esophagus.  He was recently started on a proton pump inhibitor by his wife actually which he bought over-the-counter and this seemed to help quite a bit as well.  We talked about him staying on it for 60-90 days with proton pump inhibitor and then after.  Other than that, we will do some dietary changes.  He is going to be doing modified barium swallow maneuver, so he does not aspirate.  We will see how he is doing at the end of the year in about five months.  20 minutes chart and discussion time        Again, thank you for allowing me to participate in the care of your patient.      Sincerely,    Jose F Rock MD

## 2022-07-12 NOTE — PROGRESS NOTES
Mr. Márquez is 66 years of age and is here for followup.  He had adenoid cystic cancer taken out by Dr. Magdaleno  about 25 years ago.  He has got no other current complaints at the present time today except that he probably had some reflux laryngitis.  He had some speech and swallowing problems along with some aspiration and things of this nature that seem to be associated with maybe some spasm or some other events in the upper esophagus.  He was recently started on a proton pump inhibitor by his wife actually which he bought over-the-counter and this seemed to help quite a bit as well.  We talked about him staying on it for 60-90 days with proton pump inhibitor and then after.  Other than that, we will do some dietary changes.  He is going to be doing modified barium swallow maneuver, so he does not aspirate.  We will see how he is doing at the end of the year in about five months.  20 minutes chart and discussion time

## 2022-11-15 ENCOUNTER — THERAPY VISIT (OUTPATIENT)
Dept: SPEECH THERAPY | Facility: CLINIC | Age: 67
End: 2022-11-15
Payer: COMMERCIAL

## 2022-11-15 ENCOUNTER — OFFICE VISIT (OUTPATIENT)
Dept: OTOLARYNGOLOGY | Facility: CLINIC | Age: 67
End: 2022-11-15
Payer: COMMERCIAL

## 2022-11-15 VITALS
DIASTOLIC BLOOD PRESSURE: 84 MMHG | HEIGHT: 72 IN | HEART RATE: 88 BPM | SYSTOLIC BLOOD PRESSURE: 133 MMHG | OXYGEN SATURATION: 95 % | BODY MASS INDEX: 42.66 KG/M2 | WEIGHT: 315 LBS

## 2022-11-15 DIAGNOSIS — J30.0 VMR (VASOMOTOR RHINITIS): Primary | ICD-10-CM

## 2022-11-15 DIAGNOSIS — C80.1 ADENOID CYSTIC CARCINOMA (H): ICD-10-CM

## 2022-11-15 DIAGNOSIS — Z98.890 POSTOPERATIVE STATE: ICD-10-CM

## 2022-11-15 DIAGNOSIS — R13.12 OROPHARYNGEAL DYSPHAGIA: Primary | ICD-10-CM

## 2022-11-15 PROCEDURE — 92610 EVALUATE SWALLOWING FUNCTION: CPT | Mod: GN | Performed by: SPEECH-LANGUAGE PATHOLOGIST

## 2022-11-15 PROCEDURE — 92526 ORAL FUNCTION THERAPY: CPT | Mod: GN | Performed by: SPEECH-LANGUAGE PATHOLOGIST

## 2022-11-15 PROCEDURE — 31575 DIAGNOSTIC LARYNGOSCOPY: CPT | Performed by: OTOLARYNGOLOGY

## 2022-11-15 PROCEDURE — 99213 OFFICE O/P EST LOW 20 MIN: CPT | Mod: 25 | Performed by: OTOLARYNGOLOGY

## 2022-11-15 RX ORDER — IPRATROPIUM BROMIDE 21 UG/1
2 SPRAY, METERED NASAL EVERY 12 HOURS
Qty: 30 ML | Refills: 1 | Status: SHIPPED | OUTPATIENT
Start: 2022-11-15

## 2022-11-15 ASSESSMENT — PAIN SCALES - GENERAL: PAINLEVEL: NO PAIN (0)

## 2022-11-15 NOTE — NURSING NOTE
No chief complaint on file.   Blood pressure 133/84, pulse 88, height 1.829 m (6'), weight (!) 153.2 kg (337 lb 12.8 oz), SpO2 95 %. Smiley Remy LPN

## 2022-11-15 NOTE — PROGRESS NOTES
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 Magdaleno who presents for follow up. At our last visit on 22, he endorses issues with speech as well as dysphagia, which seems to have improved with a PPI.    Today, he has been enduring cough producing green crusting on a daily basis, dysphagia with even small pills, and constant clear nasal drip, which patient's wife has to make him aware of as he himself does not notice it. Of note, patient is undergoing radiation for prostate cancer.     Review of Systems:   Pertinent items are noted in HPI or as in patient entered ROS below, remainder of complete ROS is negative.    ENT ROS 2020   Constitutional: Problems with sleep   Ears, Nose, Throat: Trouble swallowing, Hoarseness   Musculoskeletal: Back pain   Endocrine: Thirst        Physical Exam:   /84 (BP Location: Right arm, Patient Position: Sitting, Cuff Size: Adult Large)   Pulse 88   Ht 1.829 m (6')   Wt (!) 153.2 kg (337 lb 12.8 oz)   SpO2 95%   BMI 45.81 kg/m       PHYSICAL EXAMINATION:    Constitutional:  The patient was accompanied, well-groomed, and in no acute distress.    Skin:  Warm and pink.    Neurologic:  Alert and oriented x 3.  CN's III-XII within normal limits.  Voice normal.   Psychiatric:  The patient's affect was calm, cooperative, and appropriate.    Respiratory:  Breathing comfortably without stridor or exertion of accessory muscles.    Eyes: Extraocular movement intact.    Head:  Normocephalic and atraumatic.  No lesions or scars.    Ears:  Pinnae and tragus non-tender.  EAC's and TM's were clear.    Nose:  Sinuses were non-tender.  Anterior rhinoscopy revealed midline septum and absence of purulence or polyps.    OC/OP:  Normal tongue, floor  of mouth, buccal mucosa, and palate.  No lesions or masses on inspection or palpation.  No abnormal lymph tissue in the oropharynx.  The pterygoid region is non-tender.    Neck:  Supple with normal laryngeal and tracheal landmarks.  The parotid beds were without masses.  No palpable thyroid.  Lymphatic:  There is no palpable lymphadenopathy in the neck.      Fiberoptic Endoscopy:  Consent for fiberoptic laryngoscopy was obtained, and we confirmed correctness of procedure and identity of patient.  Fiberoptic laryngoscopy was indicated due to history of adenoid cystic carcinoma of parotid gland.  The nose was topically decongested and anesthetized.  The fiberoptic laryngoscope was passed under endoscopic vision.  The turbinates were normal.  The inferior and middle meati were clear bilaterally without purulence, masses, or polyps.  The nasopharynx was clear.  The Eustachian tubes were clear.  The soft palate appeared normal with good mobility.  The epiglottis was sharp and the visualized portion of the vallecula was clear.  No vocal cord inflammation. There were mobile vocal cords.  The arytenoids were clear and there was no pooling in the hypopharynx.      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 Magdaleno who presents for follow up. On exam, there is a mild amount of cerumen, which was removed today. Patient is otherwise stable. Flexible endoscopy is also reassuring. A physical copy of his swallow study results from 4/27/22 was provided to the patient and wife, and an SLP walked them through this. As for his rhinorrhea, I will prescribe him Atrovent. He will also undergo a chest x-ray as soon as able. He will follow up in a year.     Scribe Disclosure:  I, Keerthi John, am serving as a scribe to document services personally performed by Jose F Rock MD at this visit, based upon the provider's statements to me. All  documentation has been reviewed by the aforementioned provider prior to being entered into the official medical record.

## 2022-11-15 NOTE — LETTER
11/15/2022       RE: Gabriele Márquez  [address]  [facility] [address]     Dear Colleague,    Thank you for referring your patient, Gabriele Márquez, to the [facility] [place] at [facility]. Please see a copy of my visit note below.      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 Magdaleno who presents for follow up. At our last visit on 22, he endorses issues with speech as well as dysphagia, which seems to have improved with a PPI.    Today, he has been enduring cough producing green crusting on a daily basis, dysphagia with even small pills, and constant clear nasal drip, which patient's wife has to make him aware of as he himself does not notice it. Of note, patient is undergoing radiation for prostate cancer.     Review of Systems:   Pertinent items are noted in HPI or as in patient entered ROS below, remainder of complete ROS is negative.    ENT ROS 2020   Constitutional: Problems with sleep   Ears, Nose, Throat: Trouble swallowing, Hoarseness   Musculoskeletal: Back pain   Endocrine: Thirst        Physical Exam:   /84 (BP Location: Right arm, Patient Position: Sitting, Cuff Size: Adult Large)   Pulse 88   Ht 1.829 m (6')   Wt (!) 153.2 kg (337 lb 12.8 oz)   SpO2 95%   BMI 45.81 kg/m       PHYSICAL EXAMINATION:    Constitutional:  The patient was accompanied, well-groomed, and in no acute distress.    Skin:  Warm and pink.    Neurologic:  Alert and oriented x 3.  CN's III-XII within normal limits.  Voice normal.   Psychiatric:  The patient's affect was calm, cooperative, and appropriate.    Respiratory:  Breathing comfortably without stridor or exertion of accessory  muscles.    Eyes: Extraocular movement intact.    Head:  Normocephalic and atraumatic.  No lesions or scars.    Ears:  Pinnae and tragus non-tender.  EAC's and TM's were clear.    Nose:  Sinuses were non-tender.  Anterior rhinoscopy revealed midline septum and absence of purulence or polyps.    OC/OP:  Normal tongue, floor of mouth, buccal mucosa, and palate.  No lesions or masses on inspection or palpation.  No abnormal lymph tissue in the oropharynx.  The pterygoid region is non-tender.    Neck:  Supple with normal laryngeal and tracheal landmarks.  The parotid beds were without masses.  No palpable thyroid.  Lymphatic:  There is no palpable lymphadenopathy in the neck.      Fiberoptic Endoscopy:  Consent for fiberoptic laryngoscopy was obtained, and we confirmed correctness of procedure and identity of patient.  Fiberoptic laryngoscopy was indicated due to history of adenoid cystic carcinoma of parotid gland.  The nose was topically decongested and anesthetized.  The fiberoptic laryngoscope was passed under endoscopic vision.  The turbinates were normal.  The inferior and middle meati were clear bilaterally without purulence, masses, or polyps.  The nasopharynx was clear.  The Eustachian tubes were clear.  The soft palate appeared normal with good mobility.  The epiglottis was sharp and the visualized portion of the vallecula was clear.  No vocal cord inflammation. There were mobile vocal cords.  The arytenoids were clear and there was no pooling in the hypopharynx.      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 Magdaleno who presents for follow up. On exam, there is a mild amount of cerumen, which was removed today. Patient is otherwise stable. Flexible endoscopy is also reassuring. A physical copy of his swallow study results from 4/27/22 was provided to the patient and wife, and an SLP walked them through this. As for his  rhinorrhea, I will prescribe him Atrovent. He will also undergo a chest x-ray as soon as able. He will follow up in a year.     Scribe Disclosure:  I, Keerthi John, am serving as a scribe to document services personally performed by Jose F Rock MD at this visit, based upon the provider's statements to me. All documentation has been reviewed by the aforementioned provider prior to being entered into the official medical record.     Again, thank you for allowing me to participate in the care of your patient.      Sincerely,    Jose F Rock MD

## 2022-11-16 NOTE — PROGRESS NOTES
Speech-Language Pathology Department   EVALUATION  [facility]  Clinical Swallow Evaluation.   11/15/22 0831       Present No   General Information   Type Of Visit Initial   Start Of Care Date 11/15/22   Referring Physician Dr Jose F Rock   Orders Evaluate And Treat   Orders Comment Clinical Swallow Study   Medical Diagnosis Oropharyngeal dysphagia.   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 Magdaleno who presents for follow up in ENT clinic with Dr Rock. 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 Pt was seen by speech therapy today in conjunction with ENT visit.    Today, Pt reports coughing up thick mucus after meals and difficulty with swallowing pills. Pt had a video swallow study at an outside hospital in April 2022, and demonstrated deep penetration with no aspiration. Of note, patient is undergoing radiation for prostate cancer. Pt reports that he feels his swallow has worsened over the years and is now concerned for aspiration pneumonia.   Respiratory Status Room air   Prior Level Of Function Swallowing   Prior Level Of Function Comment Regular diet with thin liquids   Living Environment [facility]   General Observations Pt pleasant and cooperative, accompanied by his wife   Patient/family Goals To improve swallow function   Clinical Swallow Evaluation   Oral Musculature anomalies present   Structural Abnormalities present   Dentition other (see comments)  (missing teeth)    Secretion Management right corner drooling   Mucosal Quality adequate   Mandibular Strength and Mobility intact   Oral Labial Strength and Mobility impaired seal;impaired pursing;impaired retraction;impaired coordination   Lingual Strength and Mobility WFL   Velar Elevation intact   Buccal Strength and Mobility impaired   Laryngeal Function Swallow   Oral Musculature Comments left facial paralysis, right central facial weakness   Additional Documentation Yes   Additional evaluation(s) completed today No   Swallow Eval   Feeding Assistance no assistance needed   Clinical Swallow Eval: Thin Liquid Texture Trial   Mode of Presentation, Thin Liquids cup;self-fed   Volume of Liquid or Food Presented 4 oz   Oral Phase of Swallow WFL   Pharyngeal Phase of Swallow other (see comments)  (multiple swallows per sip)   Diagnostic Statement No overt signs of aspiration were observed. Pt was taking multiple swallows per sip.   Clinical Swallow Eval: Puree Solid Texture Trial   Mode of Presentation, Puree spoon;self-fed   Volume of Puree Presented 3 oz   Oral Phase, Puree Delayed AP movement   Pharyngeal Phase, Puree other (see comments)  (No overt signs of aspiration. multiple swallows per bite.)   Diagnostic Statement No overt signs of aspiration. multiple swallows per bite.   Clinical Swallow Eval: Regular (Solid)   Mode of Presentation self-fed   Volume Presented 2 [employer]   Oral Phase Delayed AP movement;Residue in oral cavity   Pharyngeal Phase reduction in laryngeal movement   Diagnostic Statement Delayed and effortful AP movement. No overt signs of aspriation. Multiple swallows needed and a liquid wash.   Swallow Compensations   Swallow Compensations Alternate viscosity of consistencies;Multiple swallow   Results No difficulties noted   Educational Assessment   Barriers to Learning No barriers   Esophageal Phase of Swallow   Patient reports or presents with symptoms of esophageal dysphagia No   General  Therapy Interventions   Planned Therapy Interventions Dysphagia Treatment   Dysphagia treatment Oropharyngeal exercise training;Modified diet education;Instruction of safe swallow strategies;Compensatory strategies for swallowing   Swallow Eval: Clinical Impressions   Skilled Criteria for Therapy Intervention Skilled criteria met.  Treatment indicated.   Functional Assessment Scale (FAS) 6   Treatment Diagnosis Minimal oropharyngeal dysphagia   Diet texture recommendations Thin liquids (level 0);Regular diet   Recommended Feeding/Eating Techniques alternate between small bites and sips of food/liquid;small sips/bites;hard swallow w/ each bite or sip   Rehab Potential good, to achieve stated therapy goals   Therapy Frequency other (see comments)  (1 time per month)   Predicted Duration of Therapy Intervention (days/wks) Up to 6 months   Anticipated Discharge Disposition home   Risks and Benefits of Treatment have been explained. Yes   Patient, family and/or staff in agreement with Plan of Care Yes   Clinical Impression Comments Pt presents with minimal oropharyngeal dysphagia. Oral motor assessment demonstrates adequate lingual strength and range of motion, labial weakness and difficulty with labial seal and buccal strength on the left and right side. More movement on the right, but reduced. Oral phase demonstrates increased mastication with solids, but minimal oral residue. No labial spillage on any consistency observed today. Pharyngeal phase demonstrates decreased hyolaryngeal elevation and excursion upon palpation. Hardened tissue on the left side from late effects of radiation. No overt signs of aspiration were observed today. No coughing, throat clearing or wet vocal quality. However, pt does present with risk of aspiration as a result of oral and pharyngeal weakness. Recommend pt to continue with a regular diet with thin liquids. Recommend implementing safe swallowing strategies. Recommend initiation of  pharyngeal strengthening exercises. Pt was educated on the long term effects of radiation on swallowing and that Pt will have to adhere to pharyngeal strengthening exercises to maintain swallow function at this stage post radiation.Pt and wife were educated on the results of the assessment and recommendations. Pt and wife demonstrate comprehension of information and agree with the plan of care.   Swallow Goals   SLP Swallow Goals 1;2   Swallow Goal 1   Goal Identifier Diet   Goal Description Pt will tolerate a regular diet with thin liquids with independence for use of safe swallow strategies.   Target Date 02/14/23   Swallow Goal 2   Goal Identifier Exercise   Goal Description Pt will complete 5 oropharyngeal exericses with independence and adherence to recommended frequency and duration   Target Date 02/14/23   Total Session Time   SLP Eval: oral/pharyngeal swallow function, clinical minutes (77346) 30   Total Evaluation Time 30   Therapy Certification   Certification date from 11/15/22   Certification date to 02/14/23   Medical Diagnosis Oropharyngeal dysphagia   Certification I certify the need for these services furnished under this plan of treatment and while under my care.  (Physician co-signature of this document indicates review and certification of the therapy plan).     Thank you for the referral of Gabriele Márquez. If you have any questions about this report, please contact me using the information below.    Pretty Munoz MS, CCC-SLP  Speech-Language Pathology  [facility]  Department of Otolaryngology/D&T - 4th floor  Phone: [phone]  Email: [email or web address]

## 2022-11-16 NOTE — PROGRESS NOTES
[facility]          OUTPATIENT SWALLOW  EVALUATION  PLAN OF TREATMENT FOR OUTPATIENT REHABILITATION  (COMPLETE FOR INITIAL CLAIMS ONLY)  Patient's Last Name, First Name, M.I.  YOB: 1955  Gabriele Márquez     Provider's Name   [facility]   Medical Record No.  [id number]     Start of Care Date:  11/15/22   Onset Date:  11/15/22   Type:     ___PT   ____OT  ___X_SLP Medical Diagnosis:  Oropharyngeal dysphagia     Treatment Diagnosis:  Minimal oropharyngeal dysphagia Visits from SOC:  1     _________________________________________________________________________________  Plan of Treatment/Functional Goals:  Planned Therapy Interventions: Dysphagia Treatment  Dysphagia treatment: Oropharyngeal exercise training, Modified diet education, Instruction of safe swallow strategies, Compensatory strategies for swallowing        Goals   1. Goal Identifier: Diet       Goal Description: Pt will tolerate a regular diet with thin liquids with independence for use of safe swallow strategies.       Target Date: 02/14/23           2. Goal Identifier: Exercise       Goal Description: Pt will complete 5 oropharyngeal exericses with independence and adherence to recommended frequency and duration       Target Date: 02/14/23            Therapy Frequency: other (see comments) (1 time per month)  Predicted Duration of Therapy Intervention (days/wks): Up to 6 months    Pretty Munoz       I CERTIFY THE NEED FOR THESE SERVICES FURNISHED UNDER        THIS PLAN OF TREATMENT AND WHILE UNDER MY CARE     (Physician attestation of this document indicates review and certification of the therapy plan).                  Certification date from: 11/15/22 Certification date to: 02/14/23          Referring Physician: Dr Jose F Rock    Initial Assessment        See Epic Evaluation Start Of Care Date: 11/15/22

## 2022-11-19 ENCOUNTER — HEALTH MAINTENANCE LETTER (OUTPATIENT)
Age: 67
End: 2022-11-19

## 2022-12-16 ENCOUNTER — VIRTUAL VISIT (OUTPATIENT)
Dept: SPEECH THERAPY | Facility: CLINIC | Age: 67
End: 2022-12-16
Payer: COMMERCIAL

## 2022-12-16 DIAGNOSIS — R13.12 OROPHARYNGEAL DYSPHAGIA: Primary | ICD-10-CM

## 2022-12-16 PROCEDURE — 92526 ORAL FUNCTION THERAPY: CPT | Mod: GN | Performed by: SPEECH-LANGUAGE PATHOLOGIST

## 2022-12-19 ENCOUNTER — TELEPHONE (OUTPATIENT)
Dept: OTOLARYNGOLOGY | Facility: CLINIC | Age: 67
End: 2022-12-19

## 2022-12-19 DIAGNOSIS — C80.1 ADENOID CYSTIC CARCINOMA (H): Primary | ICD-10-CM

## 2022-12-21 ENCOUNTER — TELEPHONE (OUTPATIENT)
Dept: OTOLARYNGOLOGY | Facility: CLINIC | Age: 67
End: 2022-12-21

## 2023-01-09 NOTE — PROGRESS NOTES
Gabriele Márquez is a 67 year old male who is being seen via a billable video visit.      Patient has given verbal consent for Video visit? Yes    Video Start Time: 1000    Telehealth Visit Details    Type of Service:  Telehealth    Video End Time (time video stopped): 1030    Originating Location (pt. location): Home    Additional Participants in Telehealth Visit: Pts wife    Distant Location (provider location):   On-site    Mode of Communication (Audio Visual or Audio Only):  Audio Visual    Pretty Munoz  December 16, 2022

## 2023-04-04 ENCOUNTER — THERAPY VISIT (OUTPATIENT)
Dept: SPEECH THERAPY | Facility: CLINIC | Age: 68
End: 2023-04-04
Attending: OTOLARYNGOLOGY
Payer: COMMERCIAL

## 2023-04-04 ENCOUNTER — ANCILLARY PROCEDURE (OUTPATIENT)
Dept: GENERAL RADIOLOGY | Facility: CLINIC | Age: 68
End: 2023-04-04
Attending: OTOLARYNGOLOGY
Payer: COMMERCIAL

## 2023-04-04 ENCOUNTER — TELEPHONE (OUTPATIENT)
Dept: SPEECH THERAPY | Facility: CLINIC | Age: 68
End: 2023-04-04

## 2023-04-04 ENCOUNTER — ANCILLARY PROCEDURE (OUTPATIENT)
Dept: CT IMAGING | Facility: CLINIC | Age: 68
End: 2023-04-04
Attending: OTOLARYNGOLOGY
Payer: COMMERCIAL

## 2023-04-04 DIAGNOSIS — C80.1 ADENOID CYSTIC CARCINOMA (H): ICD-10-CM

## 2023-04-04 DIAGNOSIS — Z98.890 POSTOPERATIVE STATE: ICD-10-CM

## 2023-04-04 DIAGNOSIS — R13.12 OROPHARYNGEAL DYSPHAGIA: Primary | ICD-10-CM

## 2023-04-04 LAB — RADIOLOGIST FLAGS: NORMAL

## 2023-04-04 PROCEDURE — 92611 MOTION FLUOROSCOPY/SWALLOW: CPT | Mod: GN | Performed by: SPEECH-LANGUAGE PATHOLOGIST

## 2023-04-04 PROCEDURE — 92526 ORAL FUNCTION THERAPY: CPT | Mod: GN | Performed by: SPEECH-LANGUAGE PATHOLOGIST

## 2023-04-04 PROCEDURE — 71046 X-RAY EXAM CHEST 2 VIEWS: CPT | Performed by: RADIOLOGY

## 2023-04-04 PROCEDURE — 70491 CT SOFT TISSUE NECK W/DYE: CPT | Mod: GC | Performed by: RADIOLOGY

## 2023-04-04 PROCEDURE — 74230 X-RAY XM SWLNG FUNCJ C+: CPT | Mod: GC | Performed by: RADIOLOGY

## 2023-04-04 RX ORDER — BARIUM SULFATE 400 MG/ML
15 SUSPENSION ORAL ONCE
Status: COMPLETED | OUTPATIENT
Start: 2023-04-04 | End: 2023-04-04

## 2023-04-04 RX ORDER — IOPAMIDOL 755 MG/ML
90 INJECTION, SOLUTION INTRAVASCULAR ONCE
Status: COMPLETED | OUTPATIENT
Start: 2023-04-04 | End: 2023-04-04

## 2023-04-04 RX ADMIN — BARIUM SULFATE 15 ML: 400 SUSPENSION ORAL at 13:44

## 2023-04-04 RX ADMIN — IOPAMIDOL 90 ML: 755 INJECTION, SOLUTION INTRAVASCULAR at 14:30

## 2023-04-04 NOTE — TELEPHONE ENCOUNTER
This patient needs to be schedule for a 30min video visit in 2 weeks time (around 4/18/23).  This can be scheduled with any of the swallow providers.

## 2023-04-04 NOTE — PROGRESS NOTES
Speech-Language Pathology Department   EVALUATION  [facility]  VIDEO SWALLOW STUDY RESULTS        04/04/23 1300       Present No   General Information   Type Of Visit Initial   Start Of Care Date 03/31/23   Referring Physician Jose F Rock MD   Orders Evaluate And Treat   Orders Comment Video swallow study   Medical Diagnosis Adenoid cystic carcinoma, Dysphagia   Onset Of Illness/injury Or Date Of Surgery 12/19/22   Precautions/limitations No Known Precautions/limitations   Hearing WFL for conversational speech production   Pertinent History of Current Problem/OT: Additional Occupational Profile Info Gabriele Márquez is a 67 year old male with a 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 Magdaleno. 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 Pt was seen for a video swallow study this date after c/o dysphagia at a recent ENT clinic visit.  At that time, pt reported coughing up thick mucus ~30 minutes after meals and difficulty with swallowing pills.  Additionally, pt has a history of a VFSS occuring in April 2022, and demonstrated deep penetration with no aspiration. Of note, patient is undergoing radiation for prostate cancer. Pt reports that he feels his swallow has worsened over the years.   Respiratory Status Room air   Prior Level Of Function Swallowing   Prior Level Of Function Comment Regular textures and thin liquids   General Observations Pt is pleasant and cooperative; pt presents with his wife.   Patient/family Goals Goals not formally stated at the time of evaluation.   Clinical Swallow Evaluation   Oral Musculature anomalies present   Structural Abnormalities present  (L facial paralysis with limited eye closure)   Dentition  present and adequate;other (see comments)  (Several missing)   Mucosal Quality dry   Oral Labial Strength and Mobility impaired retraction;impaired seal;impaired coordination;impaired pursing   Lingual Strength and Mobility impaired anterior elevation   Buccal Strength and Mobility impaired   Laryngeal Function Cough;Throat clear;Swallow;Voicing initiated   Additional evaluation(s) completed today Yes   Rationale for completing additional evaluation View pharyngeal phase and r/o aspiration.   VFSS Evaluation   VFSS Additional Documentation Yes   VFSS Eval: Radiology   Radiologist [facility] Radiology Resident   Views Taken left lateral;A/P   Physical Location of Procedure [facility] Radiology #2   VFSS Eval: Thin Liquid Texture Trial   Mode of Presentation, Thin Liquid cup;straw;self-fed   Order of Presentation 1, 2, 3, 8, 10, 12, 13, 16 (AP)   Preparatory Phase Poor bolus control   Oral Phase, Thin Liquid Premature pharyngeal entry;Residue in oral cavity   Pharyngeal Phase, Thin Liquid Residue in valleculae;Residue in pyriform sinus;Delayed swallow reflex;Pharyngeal wall coating   Rosenbek's Penetration Aspiration Scale: Thin Liquid Trial Results 3 - contrast remains above the vocal cords, visible residue remains (penetration)   Successful Strategies Trialed During Procedure, Thin Liquid other (see comments)  (Chin tuck trialed)   Diagnostic Statement Premature pharyngeal entry with reduced BOT contraction and reduced epiglottic inversion.  Poor contact between arytenoids and epiglottic petiole.  Contrast refluxed up to nasopharynx and nasal cavity upon subsequent swallows; this is increased with increased volume.  Persistent penetration; no aspiration.  Chin tuck did not eliminate penetration.   VFSS Eval: Mildly Thick Liquids    Mode of Presentation cup;self-fed   Order of Presentation 4, 5   Preparatory Phase WFL   Oral Phase Residue in oral cavity;Premature pharyngeal entry   Pharyngeal Phase  Delayed swallow reflex;Pharyngeal wall coating;Residue in valleculae;Residue in pyriform sinus   Rosenbek's Penetration Aspiration Scale 3 - contrast remains above the vocal cords, visible residue remains (penetration)   VFSS Eval: Puree Solid Texture Trial   Mode of Presentation, Puree spoon;self-fed   Order of Presentation 6, 7, 17 (AP)   Preparatory Phase Poor bolus control   Oral Phase, Puree Effortful AP movement;Residue in oral cavity;Premature pharyngeal entry   Pharyngeal Phase, Puree Delayed swallow reflex;Pharyngeal wall coating;Residue in valleculae;Residue in pyriform sinus   Rosenbek's Penetration Aspiration Scale: Puree Food Trial Results 1 - no aspiration, contrast does not enter airway   Successful Strategies Trialed During Procedure, Puree hard swallow   Diagnostic Statement Moderate to severe residue remaining along the BOT to the valleculae.  Minimal amounts noted along the lateral chanels.  No penetration or aspiration.   VFSS Eval: Regular Texture Trial (Solid)   Mode of Presentation self-fed   Order of Presentation 9, 11, 14 & 15 (barium tablet)   Preparatory Phase WFL   Oral Phase Residue in oral cavity   Pharyngeal Phase Residue in valleculae;Residue in pyriform sinus;Pharyngeal wall coating   Rosenbek's Penetration Aspiration Scale 1 - no aspiration, contrast does not enter airway   Successful Strategies Trialed During Procedure hard swallow   Diagnostic Statement Moderate to severe residue remaining along the BOT to the valleculae.  Mild amounts noted along the posterior pharyngeal wall.  No penetration or aspiration.  Residue reduces with a second swallow as well as liquid wash.  Residue does not fully clear.  No penetration or aspiration.  Tablet retrained in the valleculae and eventually cleared to the pharynx.   Swallow Compensations   Swallow Compensations Alternate viscosity of consistencies;Effortful swallow;Pacing;Reduce amounts;Multiple swallow   Educational Assessment   Barriers  to Learning No barriers   Esophageal Phase of Swallow   Patient reports or presents with symptoms of esophageal dysphagia Yes   Esophageal sweep performed during today s vidofluoroscopic exam  Please refer to radiologist's report for details;Yes   General Therapy Interventions   Planned Therapy Interventions Dysphagia Treatment   Dysphagia treatment Oropharyngeal exercise training;Instruction of safe swallow strategies;Compensatory strategies for swallowing   Swallow Eval: Clinical Impressions   Skilled Criteria for Therapy Intervention Skilled criteria met.  Treatment indicated.   Dysphagia Outcome Severity Scale (SOCO) Level 4 - SOCO   Treatment Diagnosis Mild to moderate oropharyngeal dysphagia   Diet texture recommendations Regular diet;Thin liquids (level 0)   Rehab Potential good, to achieve stated therapy goals   Demonstrates Need for Referral to Another Service other (see comments)  (GI service)   Therapy Frequency other (see comments)  (follow up 1-2x)   Predicted Duration of Therapy Intervention (days/wks) up to 12 weeks   Anticipated Discharge Disposition home   Risks and Benefits of Treatment have been explained. Yes   Patient, family and/or staff in agreement with Plan of Care Yes   Clinical Impression Comments Pt presents with mild to moderate oropharyngeal dysphagia characterized by weakness likely related to history of radiation.  Oral mechanism exam significant for facial weakness and reduced strength. Premature pharyngeal entry with reduced BOT contraction and reduced epiglottic inversion was noted.  Poor contact between arytenoids and epiglottic petiole.  Contrast refluxed up to nasopharynx and nasal cavity upon subsequent swallows; this is increased with increased volume.  Persistent penetration was noted on liquids; there was no aspiration.  Pharyngeal residue was observed on all consistencies and increased as the viscosity increased.  A second swallow or liquid wash reduced but did not fully  clear the residue.  At this time, pt is appropriate to continue with a regular texture diet and thin liquids He should be upright for intake and take small bites/sips at a slow pace swallowing x2 per bite and alternating consistencies.  Pills should be served in pureed textures.  Pt and wife were educated on these recommendations but pt would benefit from follow up treatment to ensure understanding and compliance.  Pt would also benefit from a GI consult to fully evaluate the esophagus in the setting of complaints and today's images.  Pt and wife were in agreement and would like to pursue video visits to achieve this.   Swallow Goals   SLP Swallow Goals 1;2   Swallow Goal 1   Goal Identifier 1   Goal Description With independent use of aspiration precautions and safe swallow strategies, pt will tolerate a regular texture diet and thin liquids with no evidence of aspiration or lung compromise over 3 months' time.   Target Date 07/03/23   Swallow Goal 2   Goal Identifier 2   Goal Description Pt will independently complete pharyngeal strengthening exercises 2-3x/day or as recommended by the treating therapist.   Target Date 07/03/23   Total Session Time   SLP Eval: VideoFluoroscopic Swallow function Minutes (29309) 30   Total Evaluation Time 30   Therapy Certification   Certification date from 04/04/23   Certification date to 07/03/23   Medical Diagnosis Dysphagia   Certification I certify the need for these services furnished under this plan of treatment and while under my care.  (Physician co-signature of this document indicates review and certification of the therapy plan).     Thank you for the referral of Gabriele Márquez.  If you have any questions about this report, please contact me using the information below.        Elana Sanchez MS CCC-SLP  Speech Language Pathologist   Clinical Specialist Level 3    [facility]  Dept. of Otolaryngology  Department of  Rehabilitation services  [address] [place] [id number]    Email: [email or web address]  Voicemail: [phone]  Gender Pronouns: she/her  Employed by [employer]

## 2023-04-21 ENCOUNTER — VIRTUAL VISIT (OUTPATIENT)
Dept: SPEECH THERAPY | Facility: CLINIC | Age: 68
End: 2023-04-21
Payer: COMMERCIAL

## 2023-04-21 DIAGNOSIS — R13.12 OROPHARYNGEAL DYSPHAGIA: Primary | ICD-10-CM

## 2023-04-21 PROCEDURE — 92526 ORAL FUNCTION THERAPY: CPT | Mod: GN | Performed by: SPEECH-LANGUAGE PATHOLOGIST

## 2023-04-21 NOTE — PROGRESS NOTES
Gabriele Márquez is a 67 year old male who is being seen via a billable video visit.      Patient has given verbal consent for Video visit? Yes    Video Start Time: 1100    Telehealth Visit Details    Type of Service:  Telehealth    Video End Time (time video stopped): 1130    Originating Location (pt. location): Home    Additional Participants in Telehealth Visit: Yes, pts wife    Distant Location (provider location):   [facility]    Mode of Communication (Audio Visual or Audio Only):  Audio Visual    Pretty Munoz  April 21, 2023

## 2023-09-22 ENCOUNTER — TELEPHONE (OUTPATIENT)
Dept: OTOLARYNGOLOGY | Facility: CLINIC | Age: 68
End: 2023-09-22
Payer: COMMERCIAL

## 2023-10-02 ENCOUNTER — TELEPHONE (OUTPATIENT)
Dept: OTOLARYNGOLOGY | Facility: CLINIC | Age: 68
End: 2023-10-02
Payer: COMMERCIAL

## 2023-11-28 ENCOUNTER — OFFICE VISIT (OUTPATIENT)
Dept: OTOLARYNGOLOGY | Facility: CLINIC | Age: 68
End: 2023-11-28
Payer: COMMERCIAL

## 2023-11-28 DIAGNOSIS — C80.1 ADENOID CYSTIC CARCINOMA (H): Primary | ICD-10-CM

## 2023-11-28 PROCEDURE — 99213 OFFICE O/P EST LOW 20 MIN: CPT | Performed by: OTOLARYNGOLOGY

## 2023-11-28 RX ORDER — CEPHALEXIN 250 MG/1
CAPSULE ORAL
COMMUNITY
Start: 2023-10-31

## 2023-11-28 RX ORDER — RIVAROXABAN 20 MG/1
TABLET, FILM COATED ORAL
COMMUNITY

## 2023-11-28 RX ORDER — ALBUTEROL SULFATE 90 UG/1
1-2 AEROSOL, METERED RESPIRATORY (INHALATION)
COMMUNITY
Start: 2023-09-21

## 2023-11-28 ASSESSMENT — PAIN SCALES - GENERAL: PAINLEVEL: NO PAIN (0)

## 2023-11-28 NOTE — NURSING NOTE
Gabriele Márquez's goals for this visit include:   Chief Complaint   Patient presents with    RECHECK     Couch, dysphagia, post nasal drip. States he did the swallowing tests, saw the sleep doctor, a pulmonologist.      Adele Hudson, visit facilitator

## 2023-11-28 NOTE — LETTER
2023       RE: Gabriele Márquez  [address] [facility] [address]     Dear Colleague,    Thank you for referring your patient, Gabriele Márquez, to the [facility] [place] at [facility]. Please see a copy of my visit note below.      Otolaryngology Clinic    Name: Gabriele Márquez  MRN: [id number]  Age: 68 year old  : 2023      Chief Complaint:   Follow up     History of Present Illness:   Gabriele Márquez is a 68 year old male with a history of adenoid cystic cancer s/p resection 25 year ago by Dr. Magdaleno who presents for follow up. At our last visit on 11/15/22, he endorsed a productive cough, dysphagia, and postnasal drip. Today, the dysphagia persists. He states he has finished the swallowing test and has since seen a sleep doctor as well as a pulmonologist. He was prescribed albuterol by his pulmonologist and has seen somewhat relief. Additionally, he has been losing weight recently.      Review of Systems:   Pertinent items are noted in HPI or as in patient entered ROS below, rmainder of complete ROS is negative.       2020     3:23 PM    ENT ROS   Constitutional Problems with sleep   Ears, Nose, Throat Trouble swallowing    Hoarseness   Musculoskeletal Back pain   Endocrine Thirst        Physical Exam:   There were no vitals taken for this visit.     PHYSICAL EXAMINATION:    Constitutional:  The patient was accompanied, well-groomed, and in no acute distress.    Skin:  Warm and pink.    Neurologic:  Alert and oriented x 3.  CN's III-XII within normal limits.  Voice normal.   Psychiatric:  The patient's affect was calm, cooperative, and appropriate.    Respiratory:  Breathing comfortably without stridor or exertion of accessory muscles.    Eyes: Extraocular movement intact.    Head:  Normocephalic and atraumatic.  No lesions or scars.    Ears:  Pinnae and tragus non-tender.  EAC's and TM's were  clear.    Nose:  Sinuses were non-tender.  Anterior rhinoscopy revealed midline septum and absence of purulence or polyps.    OC/OP:  Normal tongue, floor of mouth, buccal mucosa, and palate.  No lesions or masses on inspection or palpation.  No abnormal lymph tissue in the oropharynx.  The pterygoid region is non-tender.    Neck:  Supple with normal laryngeal and tracheal landmarks.  The parotid beds were without masses. No palpable thyroid.  Lymphatic:  There is no palpable lymphadenopathy in the neck.      Imaging:   XR Chest 4/4/23  IMPRESSION: Elevated left hemidiaphragm with distended stomach again  noted. Central bronchial wall thickening unchanged and may indicate  age-indeterminate airway inflammation or other reactive airway  disease. Lungs do not appear severely hyperinflated.    Assessment and Plan:  Gabriele Márquez is a 68 year old male with a history of adenoid cystic cancer s/p resection 25 year ago by Dr. Magdaleno who presents for follow up.We discussed a potential brow lifted blepharoplasty procedure, may discuss further in the future.        Scribe Disclosure:   I, Jina Nick, am serving as a scribe; to document services personally performed by Jose F Rock MD -based on data collection and the provider's statements to me.     Provider Disclosure:  I agree with above History, Review of Systems, Physical exam and Plan.  I have reviewed the content of the documentation and have edited it as needed. I have personally performed the services documented here and the documentation accurately represents those services and the decisions I have made.      Electronically signed by:  Jose F Rock MD             Again, thank you for allowing me to participate in the care of your patient.      Sincerely,    Jose F Rock MD

## 2023-11-28 NOTE — PROGRESS NOTES
Otolaryngology Clinic    Name: Gabriele Márquez  MRN: [id number]  Age: 68 year old  : 2023      Chief Complaint:   Follow up     History of Present Illness:   Gabriele Márquez is a 68 year old male with a history of adenoid cystic cancer s/p resection 25 year ago by Dr. Madgaleno who presents for follow up. At our last visit on 11/15/22, he endorsed a productive cough, dysphagia, and postnasal drip. Today, the dysphagia persists. He states he has finished the swallowing test and has since seen a sleep doctor as well as a pulmonologist. He was prescribed albuterol by his pulmonologist and has seen somewhat relief. Additionally, he has been losing weight recently.      Review of Systems:   Pertinent items are noted in HPI or as in patient entered ROS below, remainder of complete ROS is negative.       2020     3:23 PM    ENT ROS   Constitutional Problems with sleep   Ears, Nose, Throat Trouble swallowing    Hoarseness   Musculoskeletal Back pain   Endocrine Thirst        Physical Exam:   There were no vitals taken for this visit.     PHYSICAL EXAMINATION:    Constitutional:  The patient was accompanied, well-groomed, and in no acute distress.    Skin:  Warm and pink.    Neurologic:  Alert and oriented x 3.  CN's III-XII within normal limits.  Voice normal.   Psychiatric:  The patient's affect was calm, cooperative, and appropriate.    Respiratory:  Breathing comfortably without stridor or exertion of accessory muscles.    Eyes: Extraocular movement intact.    Head:  Normocephalic and atraumatic.  No lesions or scars.    Ears:  Pinnae and tragus non-tender.  EAC's and TM's were clear.    Nose:  Sinuses were non-tender.  Anterior rhinoscopy revealed midline septum and absence of purulence or polyps.    OC/OP:  Normal tongue, floor of mouth, buccal mucosa, and palate.  No lesions or masses on inspection or palpation.  No abnormal lymph tissue in the oropharynx.  The pterygoid region is non-tender.     Neck:  Supple with normal laryngeal and tracheal landmarks.  The parotid beds were without masses. No palpable thyroid.  Lymphatic:  There is no palpable lymphadenopathy in the neck.      Imaging:   XR Chest 4/4/23  IMPRESSION: Elevated left hemidiaphragm with distended stomach again  noted. Central bronchial wall thickening unchanged and may indicate  age-indeterminate airway inflammation or other reactive airway  disease. Lungs do not appear severely hyperinflated.    Assessment and Plan:  Gabriele Márquez is a 68 year old male with a history of adenoid cystic cancer s/p resection 25 year ago by Dr. Magdaleno who presents for follow up.We discussed a potential brow lifted blepharoplasty procedure, may discuss further in the future.        Scribe Disclosure:   I, Jina Nick, am serving as a scribe; to document services personally performed by Jose F Rock MD -based on data collection and the provider's statements to me.     Provider Disclosure:  I agree with above History, Review of Systems, Physical exam and Plan.  I have reviewed the content of the documentation and have edited it as needed. I have personally performed the services documented here and the documentation accurately represents those services and the decisions I have made.      Electronically signed by:  Jose F Rock MD

## 2024-01-27 ENCOUNTER — HEALTH MAINTENANCE LETTER (OUTPATIENT)
Age: 69
End: 2024-01-27

## 2024-04-02 ENCOUNTER — TELEPHONE (OUTPATIENT)
Dept: SPEECH THERAPY | Facility: CLINIC | Age: 69
End: 2024-04-02
Payer: COMMERCIAL

## 2024-04-03 ENCOUNTER — TELEPHONE (OUTPATIENT)
Dept: SPEECH THERAPY | Facility: CLINIC | Age: 69
End: 2024-04-03
Payer: COMMERCIAL

## 2024-04-08 ENCOUNTER — THERAPY VISIT (OUTPATIENT)
Dept: SPEECH THERAPY | Facility: CLINIC | Age: 69
End: 2024-04-08
Payer: MEDICARE

## 2024-04-08 DIAGNOSIS — R13.12 OROPHARYNGEAL DYSPHAGIA: Primary | ICD-10-CM

## 2024-04-08 PROCEDURE — 92612 ENDOSCOPY SWALLOW (FEES) VID: CPT | Mod: GN | Performed by: SPEECH-LANGUAGE PATHOLOGIST

## 2024-04-08 NOTE — PROGRESS NOTES
SPEECH LANGUAGE PATHOLOGY EVALUATION    See electronic medical record for Abuse and Falls Screening details.    Subjective      Presenting condition or subjective complaint: cough Pt spouse reports pt coughs a lot during and after eating. He has trouble with popcorn and other snacks. Pt reports he does not adhere to any safe swallow precautions at this time even though he has been trained on them in the past.   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 Oral mechanism exam significant for facial weakness and reduced strength. Premature pharyngeal entry with reduced BOT contraction and reduced epiglottic inversion was noted. Poor contact between arytenoids and epiglottic petiole. Contrast refluxed up to nasopharynx and nasal cavity upon subsequent swallows; this is increased with increased volume. Persistent penetration was noted on liquids; there was no aspiration. Pharyngeal residue was observed on all consistencies and increased as the viscosity increased. A second swallow or liquid wash reduced but did not fully clear the residue   Prior therapy history for the same diagnosis, illness or injury: Yes      Living Environment  Social support: With a significant other or spouse   Help at home: None  Equipment owned:       Patient goals for therapy:  none stated    Pain assessment: Pain denied     Objective     SWALLOW EVALUTION  Dysphagia history: Increased coughing reported by pt's wife while eating. Pt hasn't been washing foods down with water or other liquid.  Current Diet/Method of Nutritional Intake: thin liquids (level 0), regular diet        CLINICAL SWALLOW  EVALUATION  Oral Motor Function: Dentition: natural dentition  Secretion management: decreased  Mucosal quality: sticky  Mandibular function: intact  Oral labial function: impaired retraction, impaired pursing, impaired seal, impaired coordination on left, impaired coordination on right, impaired strength on left, impaired strength on right  Lingual function: WFL  Velar function: intact   Buccal function: impaired  Laryngeal function: cough, throat clear, volitional swallow     Level of assist required for feeding: set up only required   Textures Trialed:   Clinical Swallow Eval: Thin Liquids  Mode of presentation: cup, straw   Volume presented: 3 oz water  Preparatory Phase: WFL  Oral Phase: WFL  Pharyngeal phase of swallow: repeated swallows   Strategies trialed during procedure: none   Diagnostic statement: Pt demonstrates throat clearing intermittently and multiple swallows on thin liquid trials.      ADDITIONAL EVAL COMPLETED TODAY : yes; rationale: objective evaluation and treatment indicated    FIBEROPTIC ENDOSCOPIC EVALUATION OF SWALLOWING (FEES)  Type of Scope: adult   Scope Serial Number:  [facility] VIDEO ENF [facility]  Nares Entry/Passage: nostril entered using no topical anesthetic   Anatomical Findings:  symmetrical    Velar Elevation: reduced   Lateral Pharyngeal Wall Contraction: reduced  Secretions at onset of evaluation: sticky, valleculae   FEES Textures Trialed:   FEES Eval: Thin Liquids  Mode of Presentation: cup, self-fed   Pre-Spillage: bilaterally around epiglottis into pyriforms  Epiglottic Inversion: yes  Initiation of Pharyngeal Swallow: delayed  Base of Tongue Retraction: reduced  Pharyngeal Constriction: reduced  Penetration: yes, before swallow, during swallow - cleared spontaneously  Aspiration: not observed  Patient Response to Aspiration: not applicable  Post swallow residuals: valleculae, pyriforms, all residuals cleared with cueing    FEES Eval: Puree  Mode of Presentation:  Self-fed; spoon  Pre-Spillage: to valleculae  Epiglottic Inversion: incomplete  Initiation of Pharyngeal Swallow: timely  Base of Tongue Retraction: reduced  Pharyngeal Constriction: reduced  Penetration: not observed  Aspiration: not observed  Patient Response to Aspiration: not applicable  Post swallow residuals: valleculae, all residuals cleared with cueing (multiple swallows)    FEES Eval: Solids (popcorn)  Mode of Presentation: self-fed   Pre-Spillage: to valleculae  Epiglottic Inversion: incomplete  Initiation of Pharyngeal Swallow: timely  Base of Tongue Retraction: reduced  Pharyngeal Constriction: reduced  Penetration: not observed  Aspiration: not observed  Patient Response to Aspiration: not applicable  Post swallow residuals: valleculae, all residuals cleared with cueing, and liquid wash      ESOPHAGEAL PHASE OF SWALLOW  no observed or reported concerns related to esophageal function     SWALLOW ASSESSMENT CLINICAL IMPRESSIONS AND RATIONALE  Diet Consistency Recommendations: thin liquids (level 0), easy to chew (level 7)    Recommended Feeding/Eating Techniques: small bolus size, alternate food and liquid intake, maintain upright posture during/after eating for 30 minutes, drink liquid at the end of po intake    Medication Administration Recommendations: whole with puree  Instrumental Assessment Recommendations: repeat instrumental exam as medically appropriate.    Assessment & Plan   CLINICAL IMPRESSIONS   Medical Diagnosis:   oropharyngeal dysphagia   Treatment Diagnosis:   oropharyngeal dysphagia   Impression/Assessment: Pt is a 68 year old male with dysphagia complaints. The following significant findings have been identified: impaired swallowing, characterized by residual in the pharynx which is either cleared with a sip of liquid (solid consistencies) or a subsequent swallow with liquid consistencies. Trace penetration noted on thin liquid trials which is spontaneously cleared from laryngeal  vestibule. Pt demonstrates decreased sensation as he doesn't realize there is a moderate amount of residual in the valleculae. He was shown the images and cued to take a sip to swallow and clear the residual. Sips of liquid cleared residual from solid consistency well. Pt cued to complete safe swallow strategies during exam and he was able to visualize the effect of strategies on pharyngeal phase of the swallow. Pt encouraged to resume exercise regimen.     PLAN OF CARE  Evaluation only    Recommended Referrals to Other Professionals: none  Education Assessment:        Risks and benefits of evaluation/treatment have been explained.   Patient/Family/caregiver agrees with Plan of Care.     Evaluation Time:          Present: Not applicable     Signing Clinician: Carol French, SLP

## 2024-05-02 ENCOUNTER — TELEPHONE (OUTPATIENT)
Dept: OTOLARYNGOLOGY | Facility: CLINIC | Age: 69
End: 2024-05-02
Payer: COMMERCIAL

## 2024-05-10 ENCOUNTER — TELEPHONE (OUTPATIENT)
Dept: OTOLARYNGOLOGY | Facility: CLINIC | Age: 69
End: 2024-05-10
Payer: COMMERCIAL

## 2024-05-10 DIAGNOSIS — H91.90 HEARING LOSS: Primary | ICD-10-CM

## 2024-05-14 ENCOUNTER — OFFICE VISIT (OUTPATIENT)
Dept: OTOLARYNGOLOGY | Facility: CLINIC | Age: 69
End: 2024-05-14
Payer: COMMERCIAL

## 2024-05-14 ENCOUNTER — OFFICE VISIT (OUTPATIENT)
Dept: AUDIOLOGY | Facility: CLINIC | Age: 69
End: 2024-05-14
Attending: OTOLARYNGOLOGY
Payer: COMMERCIAL

## 2024-05-14 VITALS
HEART RATE: 67 BPM | SYSTOLIC BLOOD PRESSURE: 141 MMHG | BODY MASS INDEX: 45.22 KG/M2 | WEIGHT: 315 LBS | OXYGEN SATURATION: 94 % | DIASTOLIC BLOOD PRESSURE: 81 MMHG

## 2024-05-14 DIAGNOSIS — H91.90 HEARING LOSS: ICD-10-CM

## 2024-05-14 DIAGNOSIS — H90.A32 MIXED CONDUCTIVE AND SENSORINEURAL HEARING LOSS OF LEFT EAR WITH RESTRICTED HEARING OF RIGHT EAR: ICD-10-CM

## 2024-05-14 DIAGNOSIS — H90.3 ASYMMETRICAL SENSORINEURAL HEARING LOSS: Primary | ICD-10-CM

## 2024-05-14 DIAGNOSIS — H90.A21 SENSORINEURAL HEARING LOSS (SNHL) OF RIGHT EAR WITH RESTRICTED HEARING OF LEFT EAR: Primary | ICD-10-CM

## 2024-05-14 PROCEDURE — 92550 TYMPANOMETRY & REFLEX THRESH: CPT | Performed by: AUDIOLOGIST

## 2024-05-14 PROCEDURE — 92565 STENGER TEST PURE TONE: CPT | Performed by: AUDIOLOGIST

## 2024-05-14 PROCEDURE — 99213 OFFICE O/P EST LOW 20 MIN: CPT | Performed by: OTOLARYNGOLOGY

## 2024-05-14 PROCEDURE — 92557 COMPREHENSIVE HEARING TEST: CPT | Performed by: AUDIOLOGIST

## 2024-05-14 ASSESSMENT — PAIN SCALES - GENERAL: PAINLEVEL: NO PAIN (0)

## 2024-05-14 NOTE — PROGRESS NOTES
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 Magdaleno who presents for follow up. Today he reports of some left-sided hearing loss as well as some left tinnitus. He also notes of some dizziness which has been bother him. Denies otalgia or otorrhea.     Review of Systems:   Pertinent items are noted in HPI or as in patient entered ROS below, remainder of complete ROS is negative.       5/10/2024     6:57 AM    ENT ROS   Ears, Nose, Throat Hearing loss        Physical Exam:   BP (!) 141/81   Pulse 67   Wt (!) 151.2 kg (333 lb 6.4 oz)   SpO2 94%   BMI 45.22 kg/m       PHYSICAL EXAMINATION:    Constitutional:  The patient was accompanied, well-groomed, and in no acute distress.    Skin:  Warm and pink.    Neurologic:  Alert and oriented x 3.  CN's III-XII within normal limits.  Voice normal.   Psychiatric:  The patient's affect was calm, cooperative, and appropriate.    Respiratory:  Breathing comfortably without stridor or exertion of accessory muscles.    Eyes: Extraocular movement intact.    Head:  Normocephalic and atraumatic.  No lesions or scars.    Ears:  Pinnae and tragus non-tender.  EAC's and TM's were clear.    Nose:  Sinuses were non-tender.  Anterior rhinoscopy revealed midline septum and absence of purulence or polyps.    OC/OP:  Normal tongue, floor of mouth, buccal mucosa, and palate.  No lesions or masses on inspection or palpation.  No abnormal lymph tissue in the oropharynx.  The pterygoid region is non-tender.    Neck:  Supple with normal laryngeal and tracheal landmarks.  The parotid beds were without masses. No palpable thyroid.  Lymphatic:  There is no palpable lymphadenopathy in the neck.       Assessment and Plan:    ICD-10-CM    1. Hearing loss  H91.90  Adult Audiology  Referral        Gabriele Márquez is a 68 year old male with a history of adenoid cystic cancer s/p resection 25 year ago by Dr. Magdaleno who presents for follow up. For his hearing complaints he will be scheduled for an audiogram with our Audiology team today. Need 6-8 wk f/unit(s) for audiogram     Follow-up: No follow-ups on file.         Scribe Disclosure:   I, Jina Nick, am serving as a scribe; to document services personally performed by Jose F Rock MD -based on data collection and the provider's statements to me.     Provider Disclosure:  I agree with above History, Review of Systems, Physical exam and Plan.  I have reviewed the content of the documentation and have edited it as needed. I have personally performed the services documented here and the documentation accurately represents those services and the decisions I have made.      Electronically signed by:

## 2024-05-14 NOTE — PROGRESS NOTES
AUDIOLOGY REPORT    SUMMARY: Audiology visit completed. See audiogram for results.      RECOMMENDATIONS: Follow-up with ENT.    Laureen Maxwell, [facility]  Licensed Audiologist  [place] #[id number]

## 2024-05-14 NOTE — LETTER
2024       RE: Gabriele Márquez  [address] [facility] [address]     Dear Colleague,    Thank you for referring your patient, Gabriele Márquez, to the [facility] EAR NOSE AND THROAT [facility]INIC [place] at [facility]. Please see a copy of my visit note below.      Otolaryngology Clinic    Name: Gabriele Márquez  MRN: [id number]  Age: 68 year old  : 2022      Chief Complaint:   Follow up     History of Present Illness:   Gabriele Márquez is a 68 year old male with a history of adenoid cystic cancer s/p resection 25 year ago by Dr. Magdaleno who presents for follow up. Today he reports of some left-sided hearing loss as well as some left tinnitus. He also notes of some dizziness which has been bother him. Denies otalgia or otorrhea.     Review of Systems:   Pertinent items are noted in HPI or as in patient entered ROS below, remainder of complete ROS is negative.       5/10/2024     6:57 AM    ENT ROS   Ears, Nose, Throat Hearing loss        Physical Exam:   BP (!) 141/81   Pulse 67   Wt (!) 151.2 kg (333 lb 6.4 oz)   SpO2 94%   BMI 45.22 kg/m       PHYSICAL EXAMINATION:    Constitutional:  The patient was accompanied, well-groomed, and in no acute distress.    Skin:  Warm and pink.    Neurologic:  Alert and oriented x 3.  CN's III-XII within normal limits.  Voice normal.   Psychiatric:  The patient's affect was calm, cooperative, and appropriate.    Respiratory:  Breathing comfortably without stridor or exertion of accessory muscles.    Eyes: Extraocular movement intact.    Head:  Normocephalic and atraumatic.  No lesions or scars.    Ears:  Pinnae and tragus non-tender.  EAC's and TM's were clear.    Nose:  Sinuses were non-tender.  Anterior rhinoscopy revealed midline septum and absence of purulence or polyps.    OC/OP:  Normal tongue, floor of mouth, buccal mucosa, and palate.  No lesions or masses on inspection or palpation.  No  abnormal lymph tissue in the oropharynx.  The pterygoid region is non-tender.    Neck:  Supple with normal laryngeal and tracheal landmarks.  The parotid beds were without masses. No palpable thyroid.  Lymphatic:  There is no palpable lymphadenopathy in the neck.       Assessment and Plan:    ICD-10-CM    1. Hearing loss  H91.90 Adult Audiology  Referral        Gabriele Márquez is a 68 year old male with a history of adenoid cystic cancer s/p resection 25 year ago by Dr. Magdaleno who presents for follow up. For his hearing complaints he will be scheduled for an audiogram with our Audiology team today. Need 6-8 wk f/unit(s) for audiogram     Follow-up: No follow-ups on file.         Scribe Disclosure:   I, Jina Nick, am serving as a scribe; to document services personally performed by Jose F Rock MD -based on data collection and the provider's statements to me.     Provider Disclosure:  I agree with above History, Review of Systems, Physical exam and Plan.  I have reviewed the content of the documentation and have edited it as needed. I have personally performed the services documented here and the documentation accurately represents those services and the decisions I have made.      Electronically signed by:        Again, thank you for allowing me to participate in the care of your patient.      Sincerely,    Jose F Rock MD

## 2024-07-09 ENCOUNTER — OFFICE VISIT (OUTPATIENT)
Dept: OTOLARYNGOLOGY | Facility: CLINIC | Age: 69
End: 2024-07-09
Payer: COMMERCIAL

## 2024-07-09 ENCOUNTER — OFFICE VISIT (OUTPATIENT)
Dept: AUDIOLOGY | Facility: CLINIC | Age: 69
End: 2024-07-09
Payer: COMMERCIAL

## 2024-07-09 VITALS — HEIGHT: 72 IN | BODY MASS INDEX: 45.22 KG/M2

## 2024-07-09 DIAGNOSIS — H90.A32 MIXED CONDUCTIVE AND SENSORINEURAL HEARING LOSS OF LEFT EAR WITH RESTRICTED HEARING OF RIGHT EAR: ICD-10-CM

## 2024-07-09 DIAGNOSIS — H90.42 SENSORINEURAL HEARING LOSS (SNHL) OF LEFT EAR WITH UNRESTRICTED HEARING OF RIGHT EAR: ICD-10-CM

## 2024-07-09 DIAGNOSIS — H90.A21 SENSORINEURAL HEARING LOSS (SNHL) OF RIGHT EAR WITH RESTRICTED HEARING OF LEFT EAR: Primary | ICD-10-CM

## 2024-07-09 DIAGNOSIS — C80.1 ADENOID CYSTIC CARCINOMA (H): ICD-10-CM

## 2024-07-09 DIAGNOSIS — J40 BRONCHITIS: Primary | ICD-10-CM

## 2024-07-09 PROCEDURE — 99212 OFFICE O/P EST SF 10 MIN: CPT | Performed by: OTOLARYNGOLOGY

## 2024-07-09 PROCEDURE — 92557 COMPREHENSIVE HEARING TEST: CPT | Performed by: AUDIOLOGIST

## 2024-07-09 PROCEDURE — 92550 TYMPANOMETRY & REFLEX THRESH: CPT | Performed by: AUDIOLOGIST

## 2024-07-09 RX ORDER — AZITHROMYCIN 250 MG/1
TABLET, FILM COATED ORAL
Qty: 6 TABLET | Refills: 2 | Status: SHIPPED | OUTPATIENT
Start: 2024-07-09 | End: 2024-07-14

## 2024-07-09 ASSESSMENT — PAIN SCALES - GENERAL: PAINLEVEL: NO PAIN (0)

## 2024-07-09 NOTE — LETTER
7/9/2024       RE: Gabirele Márquez  [address] [facility] [address]     Dear Colleague,    Thank you for referring your patient, Gabriele Márquez, to the [facility] EAR NOSE AND THROAT CLINIC [place] at [facility]. Please see a copy of my visit note below.    The patient is here for follow-up today has had a longstanding left hearing loss which now seems to be little bit worse is having some problems with swallowing and may be some bronchitis as well 25 years ago he had adenoid cystic cancer that was treated with surgery and radiotherapy.  On physical examination today the ear exam on both sides is normal the oral cavity oropharynx is clear he is felt that he had a mass that was palpable in the left cheek and I do not really feel this today and that may be hent of the lymph node in the left cheek but nothing severe exam shows no mass or adenopathy but is difficult exam as previous radiation we was recommended today to go ahead and potentially try hearing aids we went over his hearing test today he is got reasonable discrimination scores but they significant loss on the left side the same side that was very in the most.  We will follow-up with him after hearing aid evaluation      Again, thank you for allowing me to participate in the care of your patient.      Sincerely,    Jose F Rock MD

## 2024-07-09 NOTE — PROGRESS NOTES
AUDIOLOGY REPORT     SUMMARY: Audiology visit completed. See audiogram for results.       RECOMMENDATIONS: Follow-up with ENT.     Laureen Cleary CCC-A  Licensed Audiologist   [place] #[id number]

## 2024-07-09 NOTE — PROGRESS NOTES
The patient is here for follow-up today has had a longstanding left hearing loss which now seems to be little bit worse is having some problems with swallowing and may be some bronchitis as well 25 years ago he had adenoid cystic cancer that was treated with surgery and radiotherapy.  On physical examination today the ear exam on both sides is normal the oral cavity oropharynx is clear he is felt that he had a mass that was palpable in the left cheek and I do not really feel this today and that may be hent of the lymph node in the left cheek but nothing severe exam shows no mass or adenopathy but is difficult exam as previous radiation we was recommended today to go ahead and potentially try hearing aids we went over his hearing test today he is got reasonable discrimination scores but they significant loss on the left side the same side that was very in the most.  We will follow-up with him after hearing aid evaluation

## 2024-07-09 NOTE — PATIENT INSTRUCTIONS
You were seen in the ENT Clinic today by Dr. Rock. If you have any questions or concerns after your appointment, please contact us (see below)       2.   The following recommendations have been made based upon your appointment today:   -4 week phone call visit             How to Contact Us:  Send a Wisr message to your provider. Our team will respond to you via Wisr. Occasionally, we will need to call you to get further information.  For urgent matters (Monday-Friday), call the ENT Clinic: [phone] and speak with a call center team member - they will route your call appropriately.   If you'd like to speak directly with a nurse, please find our contact information below. We do our best to check voicemail frequently throughout the day, and will work to call you back within 1-2 days. For urgent matters, please use the general clinic phone numbers listed above.     DEREK Cruz  Direct: [phone]  Smiley BOOGIE LPN  Direct: [phone]         [facility]  Department of Otolaryngology

## 2024-07-10 ENCOUNTER — TELEPHONE (OUTPATIENT)
Dept: OTOLARYNGOLOGY | Facility: CLINIC | Age: 69
End: 2024-07-10
Payer: COMMERCIAL

## 2024-07-10 NOTE — TELEPHONE ENCOUNTER
Patient confirmed scheduled appointment:  Date: 8/6  Time: 3:40pm  Visit type: Return ENT   Provider: Dr. Rock  Location: Virtual   Testing/imaging:   Additional notes:

## 2024-08-06 ENCOUNTER — VIRTUAL VISIT (OUTPATIENT)
Dept: OTOLARYNGOLOGY | Facility: CLINIC | Age: 69
End: 2024-08-06
Attending: OTOLARYNGOLOGY
Payer: COMMERCIAL

## 2024-08-06 DIAGNOSIS — H90.42 SENSORINEURAL HEARING LOSS (SNHL) OF LEFT EAR WITH UNRESTRICTED HEARING OF RIGHT EAR: ICD-10-CM

## 2024-08-06 DIAGNOSIS — C80.1 ADENOID CYSTIC CARCINOMA (H): ICD-10-CM

## 2024-08-06 DIAGNOSIS — J40 BRONCHITIS: ICD-10-CM

## 2024-08-06 PROCEDURE — 99441 PR PHYSICIAN TELEPHONE EVALUATION 5-10 MIN: CPT | Mod: 93 | Performed by: OTOLARYNGOLOGY

## 2024-08-06 RX ORDER — BENZONATATE 100 MG/1
100 CAPSULE ORAL 3 TIMES DAILY PRN
Qty: 100 CAPSULE | Refills: 1 | Status: SHIPPED | OUTPATIENT
Start: 2024-08-06

## 2024-08-06 NOTE — LETTER
8/6/2024       RE: Gabriele Máruqez  [address] [facility] [address]     Dear Colleague,    Thank you for referring your patient, Gabriele Márquez, to the [facility] EAR NOSE AND THROAT CLINIC [place] at [facility]. Please see a copy of my visit note below.    Spoke with the patient today from 4 to 4:05 PM as well as his wife.  He has not gotten a hearing aid evaluation yet may not want to do that we thought that he might have it already done by this point in time.  He had some questions about different types of hearing aids I said that it might be reasonable for somebody sticking about just simple amplification to go ahead and use one of the over-the-counter devices that can be gotten for small amounts of money these days they may go ahead and check on their insurance and check with us for more formal hearing evaluation otherwise is addfelix's is a cancer patient and we will see again in 11 months with scans family understands agrees and will call for any further problems he has his hearing asymmetry and is mostly because of the radiation he received      Again, thank you for allowing me to participate in the care of your patient.      Sincerely,    Jose F Rock MD

## 2024-08-06 NOTE — PROGRESS NOTES
Spoke with the patient today from 4 to 4:05 PM as well as his wife.  He has not gotten a hearing aid evaluation yet may not want to do that we thought that he might have it already done by this point in time.  He had some questions about different types of hearing aids I said that it might be reasonable for somebody sticking about just simple amplification to go ahead and use one of the over-the-counter devices that can be gotten for small amounts of money these days they may go ahead and check on their insurance and check with us for more formal hearing evaluation otherwise is addfelix's is a cancer patient and we will see again in 11 months with scans family understands agrees and will call for any further problems he has his hearing asymmetry and is mostly because of the radiation he received

## 2025-02-01 ENCOUNTER — HEALTH MAINTENANCE LETTER (OUTPATIENT)
Age: 70
End: 2025-02-01

## 2025-05-07 ENCOUNTER — HOSPITAL ENCOUNTER (OUTPATIENT)
Dept: CT IMAGING | Facility: CLINIC | Age: 70
Discharge: HOME | End: 2025-05-07
Attending: OTOLARYNGOLOGY
Payer: MEDICARE

## 2025-05-07 DIAGNOSIS — C80.1 ADENOID CYSTIC CARCINOMA (H): ICD-10-CM

## 2025-05-07 PROCEDURE — 250N000009 HC RX 250: Performed by: OTOLARYNGOLOGY

## 2025-05-07 PROCEDURE — 70491 CT SOFT TISSUE NECK W/DYE: CPT

## 2025-05-07 PROCEDURE — 250N000011 HC RX IP 250 OP 636: Performed by: OTOLARYNGOLOGY

## 2025-05-07 PROCEDURE — 71260 CT THORAX DX C+: CPT

## 2025-05-07 RX ORDER — IOPAMIDOL 755 MG/ML
500 INJECTION, SOLUTION INTRAVASCULAR ONCE
Status: COMPLETED | OUTPATIENT
Start: 2025-05-07 | End: 2025-05-07

## 2025-05-07 RX ADMIN — SODIUM CHLORIDE 65 ML: 9 INJECTION, SOLUTION INTRAVENOUS at 14:26

## 2025-05-07 RX ADMIN — IOPAMIDOL 100 ML: 755 INJECTION, SOLUTION INTRAVENOUS at 14:26

## 2025-05-27 ENCOUNTER — RESULTS FOLLOW-UP (OUTPATIENT)
Dept: OTOLARYNGOLOGY | Facility: CLINIC | Age: 70
End: 2025-05-27

## 2025-06-02 ENCOUNTER — THERAPY VISIT (OUTPATIENT)
Dept: SPEECH THERAPY | Facility: CLINIC | Age: 70
End: 2025-06-02
Payer: MEDICARE

## 2025-06-02 DIAGNOSIS — C09.9 TONSIL CANCER (H): Primary | ICD-10-CM

## 2025-06-02 DIAGNOSIS — G51.0 FACIAL PARALYSIS: ICD-10-CM

## 2025-06-02 DIAGNOSIS — R13.12 DYSPHAGIA, OROPHARYNGEAL PHASE: Primary | ICD-10-CM

## 2025-06-02 NOTE — PATIENT INSTRUCTIONS
Swallowing Recommendations    When drinking, hold your breath, swallow and cough.   Consider choosing foods that are higher in moisture, avoiding breads and softer. This will decrease the effort and work when eating and allow eating to be more efficient.   Continue pharyngeal strengthening exercises  Continue lip/mouth exercises

## 2025-06-02 NOTE — PROGRESS NOTES
SPEECH LANGUAGE PATHOLOGY EVALUATION       Fall Risk Screen:  Have you fallen 2 or more times in the past year?: No  Have you fallen and had an injury in the past year?: No  Is patient receiving Physical Therapy Services?: No    Subjective        Presenting condition or subjective complaint: swallowing  Date of onset: 06/02/25    Relevant medical history:     Past Medical History:   Diagnosis Date    Cancer (H)     Diabetes (H)     Reduced vision     Sleep apnea     Sleep apnea     C PAP    Thyroid disease        Dates & types of surgery:    Past Surgical History:   Procedure Laterality Date    CATARACT IOL, RT/LT Left 2010    ENT SURGERY      GRAFT RIB CARTILAGE N/A 6/29/2021    Procedure: Cadaver Donor Rib;  Surgeon: Harriet Dudley MD;  Location: [facility]    HEAD & NECK SURGERY      IMPLANT GOLD WEIGHT EYELID Left 9/8/2016    Procedure: IMPLANT WEIGHT EYELID;  Surgeon: Adithya Lynch MD;  Location: [facility]    SEPTORHINOPLASTY N/A 6/29/2021    Procedure: Septorhinoplasty, Repair of Nasal Valve Collapse;  Surgeon: Harriet Dudley MD;  Location:  OR         Prior diagnostic imaging/testing results: CT scan; Video fluoroscopic swallow study     Prior therapy history for the same diagnosis, illness or injury: No      Living Environment  Social support: With a significant other or spouse   Help at home: None  Equipment owned:       Employment: No    Hobbies/Interests:      Patient goals for therapy: learn techiques    Pain assessment: Pain denied     Objective     SWALLOW EVALUTION  Dysphagia history: Patient was seen today to reassess swallow function with fees.  Patient reports his swallow appears to be declining.  Patient's wife reports he is coughing during and after eating.  Patient is currently on a regular diet with thin liquids.  Current Diet/Method of Nutritional Intake: thin liquids (level 0), regular diet        FIBEROPTIC ENDOSCOPIC EVALUATION OF SWALLOWING (FEES)  Type of Scope: adult   Scope  Serial Number: [id number]  Nares Entry/Passage: scope passed through left nares   Anatomical Findings: WNL   Velar Elevation: reduced on right, reduced on left   Lateral Pharyngeal Wall Contraction: reduced on right, reduced on left  Secretions at onset of evaluation: dried   FEES Textures Trialed:   FEES Eval: Thin Liquids  Mode of Presentation: cup, self-fed   Pre-Spillage: bilaterally around epiglottis into pyriforms  Epiglottic Inversion: no  Initiation of Pharyngeal Swallow: delayed  Base of Tongue Retraction: reduced  Pharyngeal Constriction: reduced  Penetration: yes, during swallow  Aspiration: observed after swallow, spillage through anterior commissure  Patient Response to Aspiration: cleared with cueing  Post swallow residuals: valleculae, pyriforms    FEES Eval: Mildly Thick Liquids  Mode of Presentation: cup, self-fed   Pre-Spillage: bilaterally around epiglottis into pyriforms  Epiglottic Inversion: no  Initiation of Pharyngeal Swallow: delayed  Base of Tongue Retraction: reduced  Pharyngeal Constriction: reduced  Penetration: yes, during swallow  Aspiration: observed after swallow, spillage through anterior commissure  Patient Response to Aspiration: cleared with cueing  Post swallow residuals: valleculae, pyriforms    FEES Eval: Purees  Mode of Presentation: spoon, self-fed   Pre-Spillage: bilaterally around epiglottis into pyriforms  Epiglottic Inversion: no  Initiation of Pharyngeal Swallow: delayed  Base of Tongue Retraction: reduced  Pharyngeal Constriction: reduced  Penetration: yes, during swallow  Aspiration: not observed  Patient Response to Aspiration: not applicable  Post swallow residuals: valleculae, pyriforms      FEES Eval: Solids  Mode of Presentation: self-fed   Pre-Spillage: bilaterally around epiglottis into pyriforms  Epiglottic Inversion: no  Initiation of Pharyngeal Swallow: delayed  Base of Tongue Retraction: reduced  Pharyngeal Constriction: reduced  Penetration: not  observed  Aspiration: not observed  Patient Response to Aspiration: not applicable  Post swallow residuals: not observed     ESOPHAGEAL PHASE OF SWALLOW  patient reports symptoms of esophageal dysphagia  patient presents with symptoms of esophageal dysphagia     SWALLOW ASSESSMENT CLINICAL IMPRESSIONS AND RATIONALE  Diet Consistency Recommendations: thin liquids (level 0), soft & bite-sized (level 6)    Recommended Feeding/Eating Techniques: small bolus size, slow rate of intake, alternate food and liquid intake   Medication Administration Recommendations: pills whole with water  Instrumental Assessment Recommendations: VFSS (videofluoroscopic swallowing study), recommend VFSS to assess esophageal phase of swallow     Assessment & Plan   CLINICAL IMPRESSIONS   Medical Diagnosis: Oropharyngeal dyspahgia    Treatment Diagnosis: Oropharyngeal dysphagia   Impression/Assessment: Pt is a 69 year old male with swallowing complaints. The following significant findings have been identified: impaired swallowing, characterized by deep penetration and aspiration with thin liquids and mildly thick. Moderate pharyngeal residue with puree and solids. Liquid wash helps to clear. Trials supraglottic swallow that was inconsistently successful. Noted post cricoid residue that could indicate narrowing at UES. Recommend VFSS to further assess the esophageal phase of swallow.  Identified deficits interfere with their ability to consume an oral diet and maintain nutrition as compared to previous level of function.    PLAN OF CARE  Treatment Interventions: Swallowing dysfunction and/or oral function for feeding    Prognosis to achieve stated therapy goals is good   Rehab potential is impacted by: comorbidities, current level of function, family/caregiver support, patient awareness of deficits, patient motivation, pending medical intervention, prior level of function, social support    Long Term Goals:   SLP Goal 1  Goal Identifier:  Diet  Goal Description: Pt to tolerate least restricted diet without overt signs of aspiration and independence for use of safe swallowing strategies  Rationale: To maximize safety, ease and/or independence of oral intake  Target Date: 08/30/25  SLP Goal 2  Goal Identifier: Exercises  Goal Description: Pt to complete 5 pharyngeal strengthening exercises with independence and compliance to recommended frequency and duration  Rationale: To maximize safety, ease and/or independence of oral intake  Target Date: 08/30/25      Frequency of Treatment: 2x/mo  Duration of Treatment: Up to 12 months     Recommended Referrals to Other Professionals: none  Education Assessment:   Learner/Method: Patient    Risks and benefits of evaluation/treatment have been explained.   Patient/Family/caregiver agrees with Plan of Care.     Evaluation Time:    SLP Eval: Flexible Fiberoptic Endoscopic Evaluation of Swallowing by Cine or Video Recording Minutes (53781): 50       Signing Clinician: Pretty CONSTANTINO [facility]                                                                                   OUTPATIENT SPEECH LANGUAGE PATHOLOGY      PLAN OF TREATMENT FOR OUTPATIENT REHABILITATION   Patient's Last Name, First Name, M.Gabriele Tang YOB: 1955   Provider's Name   [facility]   Medical Record No.  [id number]     Onset Date: 06/02/25 Start of Care Date: 06/02/25     Medical Diagnosis:  Oropharyngeal dyspahgia      SLP Treatment Diagnosis: Oropharyngeal dysphagia  Plan of Treatment  Frequency/Duration: 2x/mo  / Up to 12 months     Certification date from 06/02/25   To 08/30/25          See note for plan of treatment details and functional goals     Pretty Munoz                         I CERTIFY THE NEED FOR THESE SERVICES FURNISHED UNDER        THIS PLAN OF TREATMENT AND WHILE UNDER MY CARE     (Physician attestation of this document indicates review and  certification of the therapy plan).              Referring Provider:  Jose F Rock    Initial Assessment  See Epic Evaluation- 06/02/25

## 2025-06-18 ENCOUNTER — VIRTUAL VISIT (OUTPATIENT)
Dept: SPEECH THERAPY | Facility: CLINIC | Age: 70
End: 2025-06-18
Payer: COMMERCIAL

## 2025-06-18 DIAGNOSIS — R13.12 DYSPHAGIA, OROPHARYNGEAL PHASE: Primary | ICD-10-CM

## 2025-06-18 DIAGNOSIS — G51.0 FACIAL PARALYSIS: ICD-10-CM

## 2025-06-18 DIAGNOSIS — R13.12 OROPHARYNGEAL DYSPHAGIA: ICD-10-CM

## 2025-07-03 ENCOUNTER — VIRTUAL VISIT (OUTPATIENT)
Dept: SPEECH THERAPY | Facility: CLINIC | Age: 70
End: 2025-07-03
Payer: MEDICARE

## 2025-07-03 DIAGNOSIS — R13.12 DYSPHAGIA, OROPHARYNGEAL PHASE: Primary | ICD-10-CM

## 2025-07-03 DIAGNOSIS — R13.12 OROPHARYNGEAL DYSPHAGIA: ICD-10-CM

## 2025-07-18 ENCOUNTER — HOSPITAL ENCOUNTER (OUTPATIENT)
Dept: GENERAL RADIOLOGY | Facility: CLINIC | Age: 70
Discharge: HOME | End: 2025-07-18
Attending: OTOLARYNGOLOGY
Payer: MEDICARE

## 2025-07-18 DIAGNOSIS — C09.9 TONSIL CANCER (H): ICD-10-CM

## 2025-07-18 PROCEDURE — 74230 X-RAY XM SWLNG FUNCJ C+: CPT

## 2025-07-18 RX ORDER — BARIUM SULFATE 400 MG/ML
SUSPENSION ORAL ONCE
Status: COMPLETED | OUTPATIENT
Start: 2025-07-18 | End: 2025-07-18

## 2025-07-18 RX ADMIN — BARIUM SULFATE: 400 SUSPENSION ORAL at 11:31

## 2025-07-21 ENCOUNTER — VIRTUAL VISIT (OUTPATIENT)
Dept: SPEECH THERAPY | Facility: CLINIC | Age: 70
End: 2025-07-21
Payer: COMMERCIAL

## 2025-07-21 DIAGNOSIS — C09.9 TONSIL CANCER (H): ICD-10-CM

## 2025-07-21 DIAGNOSIS — G51.0 FACIAL PARALYSIS: ICD-10-CM

## 2025-07-21 DIAGNOSIS — R13.12 DYSPHAGIA, OROPHARYNGEAL PHASE: Primary | ICD-10-CM

## 2025-08-17 ENCOUNTER — HEALTH MAINTENANCE LETTER (OUTPATIENT)
Age: 70
End: 2025-08-17

## (undated) DEVICE — SU PLAIN 4-0 SC-1 18" 1824H

## (undated) DEVICE — DRSG GAUZE 4X4" TRAY 6939

## (undated) DEVICE — SU ETHILON 3-0 PS-1 18" 1663H

## (undated) DEVICE — SOL NACL 0.9% IRRIG 1000ML BOTTLE 2F7124

## (undated) DEVICE — SOL WATER IRRIG 1000ML BOTTLE 2F7114

## (undated) DEVICE — SPLINT NASAL DOYLE BREATHEASY 20-10500

## (undated) DEVICE — ADH LIQUID MASTISOL TOPICAL VIAL 2-3ML 0523-48

## (undated) DEVICE — DRSG TEGADERM 2 3/8X2 3/4" 1624W

## (undated) DEVICE — NDL 25GA 1.5" 305127

## (undated) DEVICE — DRAPE MAYO STAND 23X54 8337

## (undated) DEVICE — ESU GROUND PAD ADULT W/CORD E7507

## (undated) DEVICE — SYR 03ML LL W/O NDL 309657

## (undated) DEVICE — DRSG PRIMAPORE 03 1/8X6" 66000318

## (undated) DEVICE — LINEN TOWEL PACK X6 WHITE 5487

## (undated) DEVICE — NDL SPINAL 25GA 2" YALE 405078

## (undated) DEVICE — SPONGE COTTONOID 1/2X3" 80-1407

## (undated) DEVICE — LIGHT HANDLE X1 31140133

## (undated) DEVICE — SU VICRYL 5-0 P-3 18" UND J493G

## (undated) DEVICE — PAD CHUX UNDERPAD 23X24" 7136

## (undated) DEVICE — TAPE DURAPORE 1"X1.5YD SILK 1538S-1

## (undated) DEVICE — GLOVE PROTEXIS MICRO 7.5  2D73PM75

## (undated) DEVICE — BLADE KNIFE SURG 11 371111

## (undated) DEVICE — SU MONOCRYL 4-0 PS-2 18" UND Y496G

## (undated) DEVICE — SU PDS II 5-0 RB-2DA 30" Z148H

## (undated) DEVICE — ESU NDL COLORADO MICRO E1651

## (undated) DEVICE — SOL ADH LIQUID BENZOIN SWAB 0.6ML C1544

## (undated) DEVICE — SU CHROMIC 4-0 M-1DA 744G

## (undated) DEVICE — LINEN TOWEL PACK X5 5464

## (undated) DEVICE — DRAPE SHEET MED 44X70" 9355

## (undated) DEVICE — SUCTION MANIFOLD NEPTUNE 2 SYS 4 PORT 0702-020-000

## (undated) DEVICE — EYE PREP BETADINE 5% SOLUTION 30ML 0065-0411-30

## (undated) DEVICE — PACK NEURO MINOR UMMC SNE32MNMU4

## (undated) DEVICE — SPLINT AQUAPLAST 6X9"

## (undated) DEVICE — DRSG STERI STRIP 1/4X4" R1546

## (undated) DEVICE — DRSG STERI STRIP 1/2X4" R1547

## (undated) DEVICE — SHEETING SILASTIC NON REINFORCED 0.040 4X8"

## (undated) DEVICE — CATH TRAY FOLEY SURESTEP 16FR W/TMP PRB STLK LATEX A319416AM

## (undated) DEVICE — NDL 27GA 1.25" 305136

## (undated) DEVICE — GLOVE PROTEXIS MICRO 7.0  2D73PM70

## (undated) DEVICE — SU PDS II 4-0 RB-1 27" Z304H

## (undated) RX ORDER — ALBUMIN, HUMAN INJ 5% 5 %
SOLUTION INTRAVENOUS
Status: DISPENSED
Start: 2021-06-29

## (undated) RX ORDER — MUPIROCIN 20 MG/G
OINTMENT TOPICAL
Status: DISPENSED
Start: 2021-06-29

## (undated) RX ORDER — OXYMETAZOLINE HYDROCHLORIDE 0.05 G/100ML
SPRAY NASAL
Status: DISPENSED
Start: 2021-06-29

## (undated) RX ORDER — GENTAMICIN 40 MG/ML
INJECTION, SOLUTION INTRAMUSCULAR; INTRAVENOUS
Status: DISPENSED
Start: 2021-06-29

## (undated) RX ORDER — FENTANYL CITRATE 50 UG/ML
INJECTION, SOLUTION INTRAMUSCULAR; INTRAVENOUS
Status: DISPENSED
Start: 2021-06-29

## (undated) RX ORDER — PROPOFOL 10 MG/ML
INJECTION, EMULSION INTRAVENOUS
Status: DISPENSED
Start: 2021-06-29

## (undated) RX ORDER — CEFAZOLIN SODIUM IN 0.9 % NACL 3 G/100 ML
INTRAVENOUS SOLUTION, PIGGYBACK (ML) INTRAVENOUS
Status: DISPENSED
Start: 2021-06-29

## (undated) RX ORDER — LIDOCAINE HYDROCHLORIDE AND EPINEPHRINE 10; 10 MG/ML; UG/ML
INJECTION, SOLUTION INFILTRATION; PERINEURAL
Status: DISPENSED
Start: 2021-06-29

## (undated) RX ORDER — FENTANYL CITRATE-0.9 % NACL/PF 10 MCG/ML
PLASTIC BAG, INJECTION (ML) INTRAVENOUS
Status: DISPENSED
Start: 2021-06-29